# Patient Record
Sex: FEMALE | Race: WHITE | NOT HISPANIC OR LATINO | Employment: UNEMPLOYED | URBAN - METROPOLITAN AREA
[De-identification: names, ages, dates, MRNs, and addresses within clinical notes are randomized per-mention and may not be internally consistent; named-entity substitution may affect disease eponyms.]

---

## 2021-09-14 ENCOUNTER — OFFICE VISIT (OUTPATIENT)
Dept: FAMILY MEDICINE CLINIC | Facility: CLINIC | Age: 31
End: 2021-09-14

## 2021-09-14 VITALS
RESPIRATION RATE: 16 BRPM | HEIGHT: 65 IN | SYSTOLIC BLOOD PRESSURE: 100 MMHG | OXYGEN SATURATION: 97 % | TEMPERATURE: 97.8 F | HEART RATE: 88 BPM | DIASTOLIC BLOOD PRESSURE: 70 MMHG | WEIGHT: 169 LBS | BODY MASS INDEX: 28.16 KG/M2

## 2021-09-14 DIAGNOSIS — Z76.89 ENCOUNTER TO ESTABLISH CARE: ICD-10-CM

## 2021-09-14 DIAGNOSIS — B35.4 TINEA CORPORIS: Primary | ICD-10-CM

## 2021-09-14 PROCEDURE — 99214 OFFICE O/P EST MOD 30 MIN: CPT | Performed by: NURSE PRACTITIONER

## 2021-09-14 NOTE — PROGRESS NOTES
FAMILY PRACTICE OFFICE VISIT       NAME: Mariel Villar  AGE: 28 y o  SEX: female       : 1990        MRN: [id number]    Assessment and Plan     Problem List Items Addressed This Visit     None      Visit Diagnoses     Tinea corporis    -  Primary    Relevant Medications    ciclopirox (LOPROX) 0 77 % cream    Encounter to establish care              1  Tinea corporis  ciclopirox (LOPROX) 0 77 % cream   2  Encounter to establish care       This 28year old female presents today to establish care  No recent PCP  Denies any significant past medical or surgical history  Mom of one 3year old daughter, no longer breastfeeding  Is trying to conceive second child  Persistent rash over the past 2-3 months, consistent with tinea corporis  Has tried OTC Clotrimazole and Butenafine with out improvement  Will trial ciclopirox 0 77% cream  Apply thin layer twice daily for 3 weeks  If not effective to clear rash she will call  Chief Complaint     Chief Complaint   Patient presents with    Recurrent Skin Infections     NP is here for persistant ringworm       History of Present Illness     Alma Delia Lesly David is a 28year old female presenting today for rash  Has no recent PCP  Moved from [facility]   with one child, 3year old daughter  Trying to conceive second child  Denies any past medical or surgical history  Up to date on pap and gyn care  2 months ago started with rash on left breast  Initially itched, no further itching  Rash has gotten larger  Now has a new area of rash on left upper abdomen  Has used OTC clotrimazole and butenafine creams with no improvement  Review of Systems   Review of Systems   Constitutional: Negative  HENT: Negative  Respiratory: Negative  Cardiovascular: Negative  Genitourinary: Negative  Musculoskeletal: Negative  Skin: Positive for rash  Neurological: Negative  Psychiatric/Behavioral: Negative  Active Problem List   There is no problem list on file for this patient  Past Medical History:  No past medical history on file  Past Surgical History:  Past Surgical History:   Procedure Laterality Date    WISDOM TOOTH EXTRACTION         Family History:  Family History   Problem Relation Age of Onset    No Known Problems Mother     No Known Problems Father     No Known Problems Sister     No Known Problems Brother     No Known Problems Sister        Social History:  Social History     Socioeconomic History    Marital status: /Civil Union     Spouse name: Not on file    Number of children: Not on file    Years of education: Not on file    Highest education level: Not on file   Occupational History    Not on file   Tobacco Use    Smoking status: Never Smoker    Smokeless tobacco: Never Used   Vaping Use    Vaping Use: Never used   Substance and Sexual Activity    Alcohol use: Never    Drug use: Never    Sexual activity: Yes     Partners: Male     Birth control/protection: None   Other Topics Concern    Not on file   Social History Narrative    Not on file     Social Determinants of Health     Financial Resource Strain:     Difficulty of Paying Living Expenses:    Food Insecurity:     Worried About Running Out of Food in the Last Year:     [address] in the Last Year:    Transportation Needs:     Lack of Transportation (Medical):      Lack of Transportation (Non-Medical):    Physical Activity:     Days of Exercise per Week:     Minutes of Exercise per Session:    Stress:     Feeling of Stress :    Social Connections:     Frequency of Communication with Friends and Family:     Frequency of Social Gatherings with Friends and Family:     Attends Muslim Services:     Active Member of Clubs or Organizations:     Attends Club or Organization Meetings:     Marital Status:    Intimate Partner Violence:     Fear of Current or Ex-Partner:     Emotionally Abused:  Physically Abused:     Sexually Abused:        I have reviewed the patient's medical history in detail; there are no changes to the history as noted in the electronic medical record  Objective     Vitals:    09/14/21 0931   BP: 100/70   Pulse: 88   Resp: 16   Temp: 97 8 °F (36 6 °C)   TempSrc: Tympanic   SpO2: 97%   Weight: 76 7 kg (169 lb)   Height: 5' 5 35" (1 66 m)     Wt Readings from Last 3 Encounters:   09/14/21 76 7 kg (169 lb)     Physical Exam  Vitals and nursing note reviewed  Constitutional:       General: She is not in acute distress  Appearance: Normal appearance  She is not ill-appearing  Cardiovascular:      Rate and Rhythm: Normal rate and regular rhythm  Heart sounds: No murmur heard  Pulmonary:      Effort: Pulmonary effort is normal       Breath sounds: Normal breath sounds  Skin:     Findings: Rash present  Comments: Left breast with flat rash, raised borders, irregular borders  Borders of rash, pink with central clearing  Rash is located on left breast 8 o'clock to 10 o'clock  Left upper abdomen with single red sightly raised, slightly scaling 1 cm round area of rash  Neurological:      Mental Status: She is alert  Psychiatric:         Mood and Affect: Mood normal        BMI Counseling: Body mass index is 27 82 kg/m²  The BMI is above normal  Nutrition recommendations include encouraging healthy choices of fruits and vegetables  due to patient being overweight  ALLERGIES:  No Known Allergies    Current Medications     Current Outpatient Medications   Medication Sig Dispense Refill    ciclopirox (LOPROX) 0 77 % cream Apply topically 2 (two) times a day 30 g 0     No current facility-administered medications for this visit           Health Maintenance     Health Maintenance   Topic Date Due    Hepatitis C Screening  Never done    COVID-19 Vaccine (1) Never done    HIV Screening  Never done    BMI: Followup Plan  Never done    Annual Physical Never done    DTaP,Tdap,and Td Vaccines (1 - Tdap) Never done    Cervical Cancer Screening  Never done    Influenza Vaccine (1) 09/01/2021    Depression Screening  09/14/2022    BMI: Adult  09/14/2022    Pneumococcal Vaccine: Pediatrics (0 to 5 Years) and At-Risk Patients (6 to 59 Years)  Aged Out    HIB Vaccine  Aged Out    Hepatitis B Vaccine  Aged Out    IPV Vaccine  Aged Out    Hepatitis A Vaccine  Aged Out    Meningococcal ACWY Vaccine  Aged Out    HPV Vaccine  Aged Out       There is no immunization history on file for this patient      Alma Delia Carrillo

## 2021-10-05 ENCOUNTER — OFFICE VISIT (OUTPATIENT)
Dept: FAMILY MEDICINE CLINIC | Facility: CLINIC | Age: 31
End: 2021-10-05

## 2021-10-05 VITALS
RESPIRATION RATE: 16 BRPM | HEART RATE: 87 BPM | BODY MASS INDEX: 28.16 KG/M2 | DIASTOLIC BLOOD PRESSURE: 80 MMHG | TEMPERATURE: 97.8 F | OXYGEN SATURATION: 98 % | SYSTOLIC BLOOD PRESSURE: 130 MMHG | WEIGHT: 169 LBS | HEIGHT: 65 IN

## 2021-10-05 DIAGNOSIS — B35.4 TINEA CORPORIS: Primary | ICD-10-CM

## 2021-10-05 PROCEDURE — 99212 OFFICE O/P EST SF 10 MIN: CPT | Performed by: NURSE PRACTITIONER

## 2021-10-05 RX ORDER — KETOCONAZOLE 20 MG/G
CREAM TOPICAL DAILY
Qty: 30 G | Refills: 0 | Status: SHIPPED | OUTPATIENT
Start: 2021-10-05

## 2021-10-12 ENCOUNTER — TELEPHONE (OUTPATIENT)
Dept: FAMILY MEDICINE CLINIC | Facility: CLINIC | Age: 31
End: 2021-10-12

## 2021-10-14 DIAGNOSIS — B35.4 TINEA CORPORIS: Primary | ICD-10-CM

## 2021-10-14 RX ORDER — FLUCONAZOLE 150 MG/1
150 TABLET ORAL WEEKLY
Qty: 4 TABLET | Refills: 0 | Status: SHIPPED | OUTPATIENT
Start: 2021-10-14 | End: 2021-11-05

## 2021-11-11 DIAGNOSIS — B35.4 TINEA CORPORIS: Primary | ICD-10-CM

## 2021-11-11 RX ORDER — FLUCONAZOLE 150 MG/1
150 TABLET ORAL WEEKLY
Qty: 2 TABLET | Refills: 0 | Status: SHIPPED | OUTPATIENT
Start: 2021-11-11 | End: 2021-11-19

## 2021-12-27 DIAGNOSIS — R21 RASH: Primary | ICD-10-CM

## 2021-12-30 ENCOUNTER — CONSULT (OUTPATIENT)
Dept: DERMATOLOGY | Facility: CLINIC | Age: 31
End: 2021-12-30

## 2021-12-30 VITALS — TEMPERATURE: 98.2 F | HEIGHT: 65 IN | WEIGHT: 166 LBS | BODY MASS INDEX: 27.66 KG/M2

## 2021-12-30 DIAGNOSIS — L30.9 ECZEMA, UNSPECIFIED TYPE: ICD-10-CM

## 2021-12-30 PROCEDURE — 99243 OFF/OP CNSLTJ NEW/EST LOW 30: CPT | Performed by: DERMATOLOGY

## 2023-11-09 ENCOUNTER — OFFICE VISIT (OUTPATIENT)
Age: 33
End: 2023-11-09
Payer: MEDICARE

## 2023-11-09 VITALS
WEIGHT: 166 LBS | HEIGHT: 65 IN | HEART RATE: 84 BPM | SYSTOLIC BLOOD PRESSURE: 128 MMHG | BODY MASS INDEX: 27.66 KG/M2 | DIASTOLIC BLOOD PRESSURE: 84 MMHG | OXYGEN SATURATION: 99 %

## 2023-11-09 DIAGNOSIS — M99.03 SEGMENTAL DYSFUNCTION OF LUMBAR REGION: ICD-10-CM

## 2023-11-09 DIAGNOSIS — S16.1XXA CERVICAL MYOFASCIAL STRAIN, INITIAL ENCOUNTER: Primary | ICD-10-CM

## 2023-11-09 DIAGNOSIS — S33.6XXA SPRAIN OF SACROILIAC REGION, INITIAL ENCOUNTER: ICD-10-CM

## 2023-11-09 DIAGNOSIS — M99.02 SEGMENTAL DYSFUNCTION OF THORACIC REGION: ICD-10-CM

## 2023-11-09 PROCEDURE — 98941 CHIROPRACT MANJ 3-4 REGIONS: CPT | Performed by: CHIROPRACTOR

## 2023-11-09 PROCEDURE — 99203 OFFICE O/P NEW LOW 30 MIN: CPT | Performed by: CHIROPRACTOR

## 2023-11-09 NOTE — PROGRESS NOTES
Initial date of service: 11/9/23    Diagnoses and all orders for this visit:    Cervical myofascial strain, initial encounter    Sprain of sacroiliac region, initial encounter - Left    Segmental dysfunction of thoracic region    Segmental dysfunction of lumbar region      No red flags, radiculopathy or neurologic deficit appreciated clinically. Pt's symptoms and exam findings consistent with mechanical neck/ubp secondary to repetitive st/sp injury, exacerbated by postural/ergonomic stressors. Pt responded well to traction, stretches and manual mobilization of the affected spinal and myofascial jt dysfunction, with increased ROM; trial of conservative tx recommended consisting of stretching, ther-ex, graded mobilization/manipulation of the affected spinal/myofascial tissues, postural/ergonomic education, and take home stretches/exercises. If symptoms fail to improve with short trial of conservative care, appropriate imaging and referral will be coordinated. Spent greater than 30 min c pt discussing hx, pe, ddx, tx options and reviewing notes/imaging    TREATMENT: 08014  Fear avoidance behavior discussion, encouraged and reassured pt that natural course of condition is to improve over time with adherence to tx plan and home care strategies. Home care recommendations: avoid bed rest, walk (but avoid trails and uneven surfaces), gradual return to activity to tolerance (avoid anything that peripheralizes symptoms), ice 20 min on/off, watch for ice burn, call if symptoms peripheralize, worsen, or neurologic deficit progresses. Ther-ex: IASTM - discussed post procedure soreness and/or ecchymosis for up to 36 hrs, applied to affected mm hypertonicities; wall cullen, axial retraction, upper trap stretch, lev scap stretch, SCM stretch, glute stretch.  Thoracic mobilization/manipulation: prone P-A mob, supine A-P manip; cervical mobilization/manipulation: traction, diversified supine graded mobilization, lumbar side laying graded mobs; L SI LAT    HPI:  Ana Vallecillo is a 29 y.o. female   Chief Complaint   Patient presents with    Back Pain     Lower back pain-7    Neck Pain     Neck pain-7     Pt presents for eval and for neck/back pain. Pt reports increasing episodes of "throwing back or neck out". Pt has undergone chiro care with benefit in the past. Pt had baby early 2023    Back Pain  This is a recurrent problem. The current episode started more than 1 month ago. The problem occurs daily. The problem has been waxing and waning since onset. The pain is present in the gluteal, lumbar spine, sacro-iliac and thoracic spine. The quality of the pain is described as aching and stabbing. The pain does not radiate. The symptoms are aggravated by bending, twisting and standing. Stiffness is present In the morning. Neck Pain   This is a recurrent problem. The current episode started more than 1 month ago. The problem occurs daily. The problem has been waxing and waning. The pain is present in the left side, midline and right side. The quality of the pain is described as aching and stabbing. The symptoms are aggravated by bending, position, stress and twisting. The pain is Worse during the day. History reviewed. No pertinent past medical history. The following portions of the patient's history were reviewed and updated as appropriate: allergies, past family history, past medical history, past social history, past surgical history, and problem list.  Review of Systems   Musculoskeletal:  Positive for back pain and neck pain. Physical Exam  Eyes:      Extraocular Movements: Extraocular movements intact. Neck:        Comments: Pnful and limited in FL, Ext, Brot, Llf  Cardiovascular:      Pulses: Normal pulses. Abdominal:      General: There is no distension. Tenderness: There is no abdominal tenderness. Musculoskeletal:      Cervical back: Pain with movement and muscular tenderness present. Decreased range of motion. Thoracic back: Spasms and tenderness present. Decreased range of motion. Lumbar back: Spasms and tenderness present. Decreased range of motion. Negative right straight leg raise test and negative left straight leg raise test.        Back:       Comments: Pnful and limited in Brot, Llf, Ext   Lymphadenopathy:      Cervical: No cervical adenopathy. Skin:     General: Skin is warm and dry. Neurological:      Mental Status: She is alert and oriented to person, place, and time. Cranial Nerves: Cranial nerves 2-12 are intact. Sensory: Sensation is intact. Motor: Motor function is intact. Coordination: Coordination is intact. Gait: Gait is intact. Gait and tandem walk normal.      Deep Tendon Reflexes: Reflexes normal. Babinski sign absent on the right side. Babinski sign absent on the left side. Reflex Scores:       Tricep reflexes are 2+ on the right side and 2+ on the left side. Bicep reflexes are 2+ on the right side and 2+ on the left side. Brachioradialis reflexes are 2+ on the right side and 2+ on the left side. Patellar reflexes are 2+ on the right side and 2+ on the left side. Achilles reflexes are 2+ on the right side and 2+ on the left side. Psychiatric:         Mood and Affect: Mood and affect normal.         Behavior: Behavior normal.       SOFT TISSUE ASSESSMENT: Hypertonicity and tenderness palpated B C5-T6. L5/S1 erector spinae, L glutr edmin upper traps, lev scap, SCM, scalene, rhomboid, suboccipitals JOINT RECTRICTIONS: C5-T, L5/S1 and L SIJ ORTHO: Diana unremarkable for centralization/peripheralization; max foraminal comp elicits local np L; shoulder depression elicits stiffness in L upper trap; brachial plexus tension test elicits no neural tension in R/L UE; cervical distraction palliative; No follow-ups on file.

## 2023-11-28 ENCOUNTER — PROCEDURE VISIT (OUTPATIENT)
Age: 33
End: 2023-11-28
Payer: MEDICARE

## 2023-11-28 VITALS
WEIGHT: 166 LBS | OXYGEN SATURATION: 99 % | DIASTOLIC BLOOD PRESSURE: 68 MMHG | BODY MASS INDEX: 27.66 KG/M2 | HEART RATE: 101 BPM | HEIGHT: 65 IN | SYSTOLIC BLOOD PRESSURE: 110 MMHG

## 2023-11-28 DIAGNOSIS — M99.03 SEGMENTAL DYSFUNCTION OF LUMBAR REGION: ICD-10-CM

## 2023-11-28 DIAGNOSIS — M99.02 SEGMENTAL DYSFUNCTION OF THORACIC REGION: ICD-10-CM

## 2023-11-28 DIAGNOSIS — S33.6XXA SPRAIN OF SACROILIAC REGION, INITIAL ENCOUNTER: ICD-10-CM

## 2023-11-28 DIAGNOSIS — S16.1XXA CERVICAL MYOFASCIAL STRAIN, INITIAL ENCOUNTER: Primary | ICD-10-CM

## 2023-11-28 PROCEDURE — 98941 CHIROPRACT MANJ 3-4 REGIONS: CPT | Performed by: CHIROPRACTOR

## 2023-11-28 NOTE — PROGRESS NOTES
Initial date of service: 11/9/23    Diagnoses and all orders for this visit:    Cervical myofascial strain, initial encounter    Sprain of sacroiliac region, initial encounter - Left    Segmental dysfunction of thoracic region    Segmental dysfunction of lumbar region    Pt improved with reduced pain and increased ROM    TREATMENT: 69870  Ther-ex: IASTM - discussed post procedure soreness and/or ecchymosis for up to 36 hrs, applied to affected mm hypertonicities; wall cullen, axial retraction, upper trap stretch, lev scap stretch, SCM stretch, glute stretch. Thoracic mobilization/manipulation: prone P-A mob, supine A-P manip; cervical mobilization/manipulation: traction, diversified supine graded mobilization, lumbar side laying graded mobs; L SI LAT    HPI:  Jamison Sevilla is a 29 y.o. female   Chief Complaint   Patient presents with    Back Pain     Lower back pain-3    Neck Pain     Neck pain-3     Pt presents for tx for neck/back pain. Pt reports increasing episodes of "throwing back or neck out". Pt has undergone chiro care with benefit in the past. Pt had baby early 2023 11/28: Pt reports moving and feeling slightly better since last tx    Back Pain  This is a recurrent problem. The current episode started more than 1 month ago. The problem occurs daily. The problem has been waxing and waning since onset. The pain is present in the gluteal, lumbar spine, sacro-iliac and thoracic spine. The quality of the pain is described as aching and stabbing. The pain does not radiate. The symptoms are aggravated by bending, twisting and standing. Stiffness is present In the morning. Neck Pain   This is a recurrent problem. The current episode started more than 1 month ago. The problem occurs daily. The problem has been waxing and waning. The pain is present in the left side, midline and right side. The quality of the pain is described as aching and stabbing.  The symptoms are aggravated by bending, position, stress and twisting. The pain is Worse during the day. History reviewed. No pertinent past medical history. The following portions of the patient's history were reviewed and updated as appropriate: allergies, past family history, past medical history, past social history, past surgical history, and problem list.  Review of Systems   Musculoskeletal:  Positive for back pain and neck pain. Physical Exam  Neck:        Comments: Pnful and limited in FL, Ext, Brot, Llf  Musculoskeletal:      Cervical back: Pain with movement and muscular tenderness present. Decreased range of motion. Thoracic back: Spasms and tenderness present. Decreased range of motion. Lumbar back: Spasms and tenderness present. Decreased range of motion. Negative right straight leg raise test and negative left straight leg raise test.        Back:       Comments: Pnful and limited in Brot, Llf, Ext   Lymphadenopathy:      Cervical: No cervical adenopathy. Skin:     General: Skin is warm and dry. Neurological:      Mental Status: She is alert and oriented to person, place, and time. Gait: Gait is intact. Psychiatric:         Mood and Affect: Mood and affect normal.         Behavior: Behavior normal.       SOFT TISSUE ASSESSMENT: Hypertonicity and tenderness palpated B C5-T6. L5/S1 erector spinae, L glutr edmin upper traps, lev scap, SCM, scalene, rhomboid, suboccipitals JOINT RECTRICTIONS: C5-T, L5/S1 and L SIJ     Return in about 1 week (around 12/5/2023) for Next scheduled follow up.

## 2023-12-05 ENCOUNTER — PROCEDURE VISIT (OUTPATIENT)
Age: 33
End: 2023-12-05
Payer: MEDICARE

## 2023-12-05 VITALS
WEIGHT: 166 LBS | DIASTOLIC BLOOD PRESSURE: 68 MMHG | HEART RATE: 88 BPM | OXYGEN SATURATION: 98 % | HEIGHT: 65 IN | BODY MASS INDEX: 27.66 KG/M2 | SYSTOLIC BLOOD PRESSURE: 112 MMHG

## 2023-12-05 DIAGNOSIS — M99.03 SEGMENTAL DYSFUNCTION OF LUMBAR REGION: ICD-10-CM

## 2023-12-05 DIAGNOSIS — M99.02 SEGMENTAL DYSFUNCTION OF THORACIC REGION: ICD-10-CM

## 2023-12-05 DIAGNOSIS — S16.1XXA CERVICAL MYOFASCIAL STRAIN, INITIAL ENCOUNTER: Primary | ICD-10-CM

## 2023-12-05 DIAGNOSIS — S33.6XXA SPRAIN OF SACROILIAC REGION, INITIAL ENCOUNTER: ICD-10-CM

## 2023-12-05 PROCEDURE — 98941 CHIROPRACT MANJ 3-4 REGIONS: CPT | Performed by: CHIROPRACTOR

## 2023-12-05 NOTE — PROGRESS NOTES
Initial date of service: 11/9/23    Diagnoses and all orders for this visit:    Cervical myofascial strain, initial encounter    Sprain of sacroiliac region, initial encounter - Left    Segmental dysfunction of thoracic region    Segmental dysfunction of lumbar region    Pt improved with reduced pain and increased ROM    TREATMENT: 75029  Ther-ex: IASTM - discussed post procedure soreness and/or ecchymosis for up to 36 hrs, applied to affected mm hypertonicities; wall cullen, axial retraction, upper trap stretch, lev scap stretch, SCM stretch, glute stretch. Thoracic mobilization/manipulation: prone P-A mob, supine A-P manip; cervical mobilization/manipulation: traction, diversified supine graded mobilization, lumbar side laying graded mobs; L SI LAT    HPI:  Jessi Conner is a 29 y.o. female   Chief Complaint   Patient presents with    Back Pain     Lower back pain-1    Neck Pain     Neck pain-1     Pt presents for tx for neck/back pain. Pt reports increasing episodes of "throwing back or neck out". Pt has undergone chiro care with benefit in the past. Pt had baby early 2023 12/5: Pt reports moving and feeling better since last tx; was better with her home program    Back Pain  This is a recurrent problem. The current episode started more than 1 month ago. The problem occurs daily. The problem has been waxing and waning since onset. The pain is present in the gluteal, lumbar spine, sacro-iliac and thoracic spine. The quality of the pain is described as aching and stabbing. The pain does not radiate. The symptoms are aggravated by bending, twisting and standing. Stiffness is present In the morning. Neck Pain   This is a recurrent problem. The current episode started more than 1 month ago. The problem occurs daily. The problem has been waxing and waning. The pain is present in the left side, midline and right side. The quality of the pain is described as aching and stabbing.  The symptoms are aggravated by bending, position, stress and twisting. The pain is Worse during the day. History reviewed. No pertinent past medical history. The following portions of the patient's history were reviewed and updated as appropriate: allergies, past family history, past medical history, past social history, past surgical history, and problem list.  Review of Systems   Musculoskeletal:  Positive for back pain and neck pain. Physical Exam  Neck:        Comments: Pnful and limited in FL, Ext, Brot, Llf  Musculoskeletal:      Cervical back: Pain with movement and muscular tenderness present. Decreased range of motion. Thoracic back: Spasms and tenderness present. Decreased range of motion. Lumbar back: Spasms and tenderness present. Decreased range of motion. Negative right straight leg raise test and negative left straight leg raise test.        Back:       Comments: Pnful and limited in Brot, Llf, Ext   Lymphadenopathy:      Cervical: No cervical adenopathy. Skin:     General: Skin is warm and dry. Neurological:      Mental Status: She is alert and oriented to person, place, and time. Gait: Gait is intact. Psychiatric:         Mood and Affect: Mood and affect normal.         Behavior: Behavior normal.       SOFT TISSUE ASSESSMENT: Hypertonicity and tenderness palpated B C5-T6. L5/S1 erector spinae, L glutr edmin upper traps, lev scap, SCM, scalene, rhomboid, suboccipitals JOINT RECTRICTIONS: C5-T, L5/S1 and L SIJ     Return in about 1 week (around 12/12/2023).

## 2023-12-19 ENCOUNTER — PROCEDURE VISIT (OUTPATIENT)
Age: 33
End: 2023-12-19
Payer: MEDICARE

## 2023-12-19 VITALS
DIASTOLIC BLOOD PRESSURE: 68 MMHG | WEIGHT: 166 LBS | HEIGHT: 65 IN | HEART RATE: 107 BPM | SYSTOLIC BLOOD PRESSURE: 124 MMHG | BODY MASS INDEX: 27.66 KG/M2 | OXYGEN SATURATION: 97 %

## 2023-12-19 DIAGNOSIS — S16.1XXA CERVICAL MYOFASCIAL STRAIN, INITIAL ENCOUNTER: Primary | ICD-10-CM

## 2023-12-19 DIAGNOSIS — M99.03 SEGMENTAL DYSFUNCTION OF LUMBAR REGION: ICD-10-CM

## 2023-12-19 DIAGNOSIS — S33.6XXA SPRAIN OF SACROILIAC REGION, INITIAL ENCOUNTER: ICD-10-CM

## 2023-12-19 DIAGNOSIS — M99.02 SEGMENTAL DYSFUNCTION OF THORACIC REGION: ICD-10-CM

## 2023-12-19 PROCEDURE — 98941 CHIROPRACT MANJ 3-4 REGIONS: CPT | Performed by: CHIROPRACTOR

## 2023-12-19 NOTE — PROGRESS NOTES
"Initial date of service: 11/9/23    Diagnoses and all orders for this visit:    Cervical myofascial strain, initial encounter    Sprain of sacroiliac region, initial encounter - Left    Segmental dysfunction of thoracic region    Segmental dysfunction of lumbar region    Pt improved with reduced pain and increased ROM    TREATMENT: [id number]  Ther-ex: IASTM - discussed post procedure soreness and/or ecchymosis for up to 36 hrs, applied to affected mm hypertonicities; wall cullen, axial retraction, upper trap stretch, lev scap stretch, SCM stretch, glute stretch. Thoracic mobilization/manipulation: prone P-A mob, supine A-P manip; cervical mobilization/manipulation: traction, diversified supine graded mobilization, lumbar side laying graded mobs; L SI LAT    HPI:  Alma Delia Hall is a 33 y.o. female   Chief Complaint   Patient presents with    Back Pain     Lower back pain-1    Neck Pain     Neck pain-1     Pt presents for tx for neck/back pain. Pt reports increasing episodes of \"throwing back or neck out\". Pt has undergone chiro care with benefit in the past. Pt had baby early 2023 12/19: Pt reports moving and feeling better since last tx but suffered flare-up with being ill for past week    Back Pain  This is a recurrent problem. The current episode started more than 1 month ago. The problem occurs daily. The problem has been waxing and waning since onset. The pain is present in the gluteal, lumbar spine, sacro-iliac and thoracic spine. The quality of the pain is described as aching and stabbing. The pain does not radiate. The symptoms are aggravated by bending, twisting and standing. Stiffness is present In the morning.   Neck Pain   This is a recurrent problem. The current episode started more than 1 month ago. The problem occurs daily. The problem has been waxing and waning. The pain is present in the left side, midline and right side. The quality of the pain is described as aching and stabbing. The symptoms are " aggravated by bending, position, stress and twisting. The pain is Worse during the day.     History reviewed. No pertinent past medical history.   The following portions of the patient's history were reviewed and updated as appropriate: allergies, past family history, past medical history, past social history, past surgical history, and problem list.  Review of Systems   Musculoskeletal:  Positive for back pain and neck pain.     Physical Exam  Neck:        Comments: Pnful and limited in FL, Ext, Brot, Llf  Musculoskeletal:      Cervical back: Pain with movement and muscular tenderness present. Decreased range of motion.      Thoracic back: Spasms and tenderness present. Decreased range of motion.      Lumbar back: Spasms and tenderness present. Decreased range of motion. Negative right straight leg raise test and negative left straight leg raise test.        Back:       Comments: Pnful and limited in Brot, Llf, Ext   Lymphadenopathy:      Cervical: No cervical adenopathy.   Skin:     General: Skin is warm and dry.   Neurological:      Mental Status: She is alert and oriented to person, place, and time.      Gait: Gait is intact.   Psychiatric:         Mood and Affect: Mood and affect normal.         Behavior: Behavior normal.       SOFT TISSUE ASSESSMENT: Hypertonicity and tenderness palpated B C5-T6. L5/S1 erector spinae, L glutr edmin upper traps, lev scap, SCM, scalene, rhomboid, suboccipitals JOINT RECTRICTIONS: C5-T, L5/S1 and L SIJ     Return in about 2 weeks (around 1/2/2024) for Next scheduled follow up.

## 2024-02-22 ENCOUNTER — PROCEDURE VISIT (OUTPATIENT)
Age: 34
End: 2024-02-22
Payer: MEDICARE

## 2024-02-22 VITALS
HEART RATE: 85 BPM | BODY MASS INDEX: 27.66 KG/M2 | WEIGHT: 166 LBS | SYSTOLIC BLOOD PRESSURE: 114 MMHG | DIASTOLIC BLOOD PRESSURE: 66 MMHG | HEIGHT: 65 IN | OXYGEN SATURATION: 99 %

## 2024-02-22 DIAGNOSIS — S33.6XXA SPRAIN OF SACROILIAC REGION, INITIAL ENCOUNTER: ICD-10-CM

## 2024-02-22 DIAGNOSIS — M99.03 SEGMENTAL DYSFUNCTION OF LUMBAR REGION: ICD-10-CM

## 2024-02-22 DIAGNOSIS — M99.02 SEGMENTAL DYSFUNCTION OF THORACIC REGION: ICD-10-CM

## 2024-02-22 DIAGNOSIS — S16.1XXA CERVICAL MYOFASCIAL STRAIN, INITIAL ENCOUNTER: Primary | ICD-10-CM

## 2024-02-22 PROCEDURE — 98941 CHIROPRACT MANJ 3-4 REGIONS: CPT | Performed by: CHIROPRACTOR

## 2024-02-22 NOTE — PROGRESS NOTES
"Initial date of service: 11/9/23    Diagnoses and all orders for this visit:    Cervical myofascial strain, initial encounter    Sprain of sacroiliac region, initial encounter - Left    Segmental dysfunction of thoracic region    Segmental dysfunction of lumbar region    Pt suffered exacerbation but responded to tx with reduced pain and increased ROM    TREATMENT: [id number]  Ther-ex: IASTM - discussed post procedure soreness and/or ecchymosis for up to 36 hrs, applied to affected mm hypertonicities; wall cullen, axial retraction, upper trap stretch, lev scap stretch, SCM stretch, glute stretch. Thoracic mobilization/manipulation: prone P-A mob, supine A-P manip; cervical mobilization/manipulation: traction, diversified supine graded mobilization, lumbar side laying graded mobs; L SI LAT    HPI:  Alma Delia Hall is a 33 y.o. female   No chief complaint on file.    Pt presents for tx for neck/back pain. Pt reports increasing episodes of \"throwing back or neck out\". Pt has undergone chiro care with benefit in the past. Pt had baby early 2023 2/22: Pt reports moving and feeling better since last tx but suffered flare-up     Back Pain  This is a recurrent problem. The current episode started more than 1 month ago. The problem occurs daily. The problem has been waxing and waning since onset. The pain is present in the gluteal, lumbar spine, sacro-iliac and thoracic spine. The quality of the pain is described as aching and stabbing. The pain does not radiate. The symptoms are aggravated by bending, twisting and standing. Stiffness is present In the morning.   Neck Pain   This is a recurrent problem. The current episode started more than 1 month ago. The problem occurs daily. The problem has been waxing and waning. The pain is present in the left side, midline and right side. The quality of the pain is described as aching and stabbing. The symptoms are aggravated by bending, position, stress and twisting. The pain is Worse during the " day.     No past medical history on file.   The following portions of the patient's history were reviewed and updated as appropriate: allergies, past family history, past medical history, past social history, past surgical history, and problem list.  Review of Systems   Musculoskeletal:  Positive for back pain and neck pain.     Physical Exam  Neck:        Comments: Pnful and limited in [place], Ext, Brot, Llf  Musculoskeletal:      Cervical back: Pain with movement and muscular tenderness present. Decreased range of motion.      Thoracic back: Spasms and tenderness present. Decreased range of motion.      Lumbar back: Spasms and tenderness present. Decreased range of motion. Negative right straight leg raise test and negative left straight leg raise test.        Back:       Comments: Pnful and limited in Brot, Llf, Ext   Lymphadenopathy:      Cervical: No cervical adenopathy.   Skin:     General: Skin is warm and dry.   Neurological:      Mental Status: She is alert and oriented to person, place, and time.      Gait: Gait is intact.   Psychiatric:         Mood and Affect: Mood and affect normal.         Behavior: Behavior normal.       SOFT TISSUE ASSESSMENT: Hypertonicity and tenderness palpated B C5-T6. L5/S1 erector spinae, L glutr edmin upper traps, lev scap, SCM, scalene, rhomboid, suboccipitals JOINT RECTRICTIONS: C5-T, L5/S1 and L SIJ     Return in about 1 week (around 2/29/2024) for Next scheduled follow up.

## 2024-03-07 ENCOUNTER — PROCEDURE VISIT (OUTPATIENT)
Age: 34
End: 2024-03-07
Payer: MEDICARE

## 2024-03-07 VITALS
WEIGHT: 166.01 LBS | HEART RATE: 94 BPM | BODY MASS INDEX: 27.66 KG/M2 | DIASTOLIC BLOOD PRESSURE: 82 MMHG | HEIGHT: 65 IN | SYSTOLIC BLOOD PRESSURE: 120 MMHG

## 2024-03-07 DIAGNOSIS — M99.02 SEGMENTAL DYSFUNCTION OF THORACIC REGION: ICD-10-CM

## 2024-03-07 DIAGNOSIS — S16.1XXA CERVICAL MYOFASCIAL STRAIN, INITIAL ENCOUNTER: Primary | ICD-10-CM

## 2024-03-07 DIAGNOSIS — S33.6XXA SPRAIN OF SACROILIAC REGION, INITIAL ENCOUNTER: ICD-10-CM

## 2024-03-07 DIAGNOSIS — M99.03 SEGMENTAL DYSFUNCTION OF LUMBAR REGION: ICD-10-CM

## 2024-03-07 PROCEDURE — 98941 CHIROPRACT MANJ 3-4 REGIONS: CPT | Performed by: CHIROPRACTOR

## 2024-03-07 NOTE — PROGRESS NOTES
"Initial date of service: 11/9/23    Diagnoses and all orders for this visit:    Cervical myofascial strain, initial encounter    Sprain of sacroiliac region, initial encounter - Left    Segmental dysfunction of thoracic region    Segmental dysfunction of lumbar region    Pt suffered exacerbation but responded to tx with reduced pain and increased ROM    TREATMENT: [id number]  Ther-ex: IASTM - discussed post procedure soreness and/or ecchymosis for up to 36 hrs, applied to affected mm hypertonicities; wall cullen, axial retraction, upper trap stretch, lev scap stretch, SCM stretch, glute stretch. Thoracic mobilization/manipulation: prone P-A mob, supine A-P manip; cervical mobilization/manipulation: traction, diversified supine graded mobilization, lumbar side laying graded mobs; L SI LAT    HPI:  Alma Delia Hall is a 33 y.o. female   No chief complaint on file.    Pt presents for tx for neck/back pain. Pt reports increasing episodes of \"throwing back or neck out\". Pt has undergone chiro care with benefit in the past. Pt had baby early 2023  3/7: Pt reports moving and feeling better since last tx but suffered flare-up of neck pain; \"slept on it wrong\"    Back Pain  This is a recurrent problem. The current episode started more than 1 month ago. The problem occurs daily. The problem has been waxing and waning since onset. The pain is present in the gluteal, lumbar spine, sacro-iliac and thoracic spine. The quality of the pain is described as aching and stabbing. The pain does not radiate. The symptoms are aggravated by bending, twisting and standing. Stiffness is present In the morning.   Neck Pain   This is a recurrent problem. The current episode started more than 1 month ago. The problem occurs daily. The problem has been waxing and waning. The pain is present in the left side, midline and right side. The quality of the pain is described as aching and stabbing. The symptoms are aggravated by bending, position, stress and " twisting. The pain is Worse during the day.     History reviewed. No pertinent past medical history.   The following portions of the patient's history were reviewed and updated as appropriate: allergies, past family history, past medical history, past social history, past surgical history, and problem list.  Review of Systems   Musculoskeletal:  Positive for back pain and neck pain.     Physical Exam  Neck:        Comments: Pnful and limited in [place], Ext, Brot, Llf  Musculoskeletal:      Cervical back: Pain with movement and muscular tenderness present. Decreased range of motion.      Thoracic back: Spasms and tenderness present. Decreased range of motion.      Lumbar back: Spasms and tenderness present. Decreased range of motion. Negative right straight leg raise test and negative left straight leg raise test.        Back:       Comments: Pnful and limited in Brot, Llf, Ext   Lymphadenopathy:      Cervical: No cervical adenopathy.   Skin:     General: Skin is warm and dry.   Neurological:      Mental Status: She is alert and oriented to person, place, and time.      Gait: Gait is intact.   Psychiatric:         Mood and Affect: Mood and affect normal.         Behavior: Behavior normal.       SOFT TISSUE ASSESSMENT: Hypertonicity and tenderness palpated B C5-T6. L5/S1 erector spinae, L glutr edmin upper traps, lev scap, SCM, scalene, rhomboid, suboccipitals JOINT RECTRICTIONS: C5-T, L5/S1 and L SIJ     Return in about 1 week (around 3/14/2024).

## 2024-03-26 ENCOUNTER — PROCEDURE VISIT (OUTPATIENT)
Age: 34
End: 2024-03-26
Payer: MEDICARE

## 2024-03-26 VITALS
BODY MASS INDEX: 27.66 KG/M2 | SYSTOLIC BLOOD PRESSURE: 118 MMHG | HEIGHT: 65 IN | HEART RATE: 112 BPM | OXYGEN SATURATION: 99 % | DIASTOLIC BLOOD PRESSURE: 66 MMHG | WEIGHT: 166 LBS

## 2024-03-26 DIAGNOSIS — S16.1XXA CERVICAL MYOFASCIAL STRAIN, INITIAL ENCOUNTER: Primary | ICD-10-CM

## 2024-03-26 DIAGNOSIS — M99.02 SEGMENTAL DYSFUNCTION OF THORACIC REGION: ICD-10-CM

## 2024-03-26 DIAGNOSIS — S33.6XXA SPRAIN OF SACROILIAC REGION, INITIAL ENCOUNTER: ICD-10-CM

## 2024-03-26 DIAGNOSIS — M99.03 SEGMENTAL DYSFUNCTION OF LUMBAR REGION: ICD-10-CM

## 2024-03-26 PROCEDURE — 98941 CHIROPRACT MANJ 3-4 REGIONS: CPT | Performed by: CHIROPRACTOR

## 2024-03-26 NOTE — PROGRESS NOTES
"Initial date of service: 11/9/23    Diagnoses and all orders for this visit:    Cervical myofascial strain, initial encounter    Sprain of sacroiliac region, initial encounter - Left    Segmental dysfunction of thoracic region    Segmental dysfunction of lumbar region    Pt suffered exacerbation but responded to tx with reduced pain and increased ROM    TREATMENT: [id number]  Ther-ex: IASTM - discussed post procedure soreness and/or ecchymosis for up to 36 hrs, applied to affected mm hypertonicities; wall cullen, axial retraction, upper trap stretch, lev scap stretch, SCM stretch, glute stretch. Thoracic mobilization/manipulation: prone P-A mob, supine A-P manip; cervical mobilization/manipulation: traction, diversified supine graded mobilization, lumbar side laying graded mobs; L SI LAT    HPI:  Alma Delia Hall is a 34 y.o. female   Chief Complaint   Patient presents with    Back Pain     Lower back pain-2     Pt presents for tx for neck/back pain. Pt reports increasing episodes of \"throwing back or neck out\". Pt has undergone chiro care with benefit in the past. Pt had baby early 2023  3/26: Pt reports moving and feeling better since last tx but suffered flare-up of L SIJ pain    Back Pain  This is a recurrent problem. The current episode started more than 1 month ago. The problem occurs daily. The problem has been waxing and waning since onset. The pain is present in the gluteal, lumbar spine, sacro-iliac and thoracic spine. The quality of the pain is described as aching and stabbing. The pain does not radiate. The symptoms are aggravated by bending, twisting and standing. Stiffness is present In the morning.   Neck Pain   This is a recurrent problem. The current episode started more than 1 month ago. The problem occurs daily. The problem has been waxing and waning. The pain is present in the left side, midline and right side. The quality of the pain is described as aching and stabbing. The symptoms are aggravated by " bending, position, stress and twisting. The pain is Worse during the day.     History reviewed. No pertinent past medical history.   The following portions of the patient's history were reviewed and updated as appropriate: allergies, past family history, past medical history, past social history, past surgical history, and problem list.  Review of Systems   Musculoskeletal:  Positive for back pain and neck pain.     Physical Exam  Neck:        Comments: Pnful and limited in [place], Ext, Brot, Llf  Musculoskeletal:      Cervical back: Pain with movement and muscular tenderness present. Decreased range of motion.      Thoracic back: Spasms and tenderness present. Decreased range of motion.      Lumbar back: Spasms and tenderness present. Decreased range of motion. Negative right straight leg raise test and negative left straight leg raise test.        Back:       Comments: Pnful and limited in Brot, Llf, Ext   Lymphadenopathy:      Cervical: No cervical adenopathy.   Skin:     General: Skin is warm and dry.   Neurological:      Mental Status: She is alert and oriented to person, place, and time.      Gait: Gait is intact.   Psychiatric:         Mood and Affect: Mood and affect normal.         Behavior: Behavior normal.       SOFT TISSUE ASSESSMENT: Hypertonicity and tenderness palpated B C5-T6. L5/S1 erector spinae, L glutr edmin upper traps, lev scap, SCM, scalene, rhomboid, suboccipitals JOINT RECTRICTIONS: C5-T, L5/S1 and L SIJ     Return in about 1 week (around 4/2/2024) for Next scheduled follow up.

## 2024-04-11 ENCOUNTER — PROCEDURE VISIT (OUTPATIENT)
Age: 34
End: 2024-04-11
Payer: MEDICARE

## 2024-04-11 VITALS
WEIGHT: 166 LBS | OXYGEN SATURATION: 99 % | BODY MASS INDEX: 27.66 KG/M2 | HEART RATE: 104 BPM | SYSTOLIC BLOOD PRESSURE: 118 MMHG | DIASTOLIC BLOOD PRESSURE: 74 MMHG | HEIGHT: 65 IN

## 2024-04-11 DIAGNOSIS — S33.6XXA SPRAIN OF SACROILIAC REGION, INITIAL ENCOUNTER: ICD-10-CM

## 2024-04-11 DIAGNOSIS — S16.1XXA CERVICAL MYOFASCIAL STRAIN, INITIAL ENCOUNTER: Primary | ICD-10-CM

## 2024-04-11 DIAGNOSIS — M99.03 SEGMENTAL DYSFUNCTION OF LUMBAR REGION: ICD-10-CM

## 2024-04-11 DIAGNOSIS — M99.02 SEGMENTAL DYSFUNCTION OF THORACIC REGION: ICD-10-CM

## 2024-04-11 PROCEDURE — 98941 CHIROPRACT MANJ 3-4 REGIONS: CPT | Performed by: CHIROPRACTOR

## 2024-04-11 NOTE — PROGRESS NOTES
"Initial date of service: 11/9/23    Diagnoses and all orders for this visit:    Cervical myofascial strain, initial encounter    Sprain of sacroiliac region, initial encounter - Left    Segmental dysfunction of thoracic region    Segmental dysfunction of lumbar region    Pt suffered exacerbation but responded to tx with reduced pain and increased ROM    TREATMENT: [id number]  Ther-ex: IASTM - discussed post procedure soreness and/or ecchymosis for up to 36 hrs, applied to affected mm hypertonicities; wall cullen, axial retraction, upper trap stretch, lev scap stretch, SCM stretch, glute stretch. Thoracic mobilization/manipulation: prone P-A mob, supine A-P manip; cervical mobilization/manipulation: traction, diversified supine graded mobilization, lumbar side laying graded mobs; L SI LAT    HPI:  Alma Delia Hall is a 34 y.o. female   Chief Complaint   Patient presents with    Back Pain     Lower back pain-2     Pt presents for tx for neck/back pain. Pt reports increasing episodes of \"throwing back or neck out\". Pt has undergone chiro care with benefit in the past. Pt had baby early 2023 4/11: Pt reports moving and feeling better since last tx     Back Pain  This is a recurrent problem. The current episode started more than 1 month ago. The problem occurs daily. The problem has been waxing and waning since onset. The pain is present in the gluteal, lumbar spine, sacro-iliac and thoracic spine. The quality of the pain is described as aching and stabbing. The pain does not radiate. The symptoms are aggravated by bending, twisting and standing. Stiffness is present In the morning.   Neck Pain   This is a recurrent problem. The current episode started more than 1 month ago. The problem occurs daily. The problem has been waxing and waning. The pain is present in the left side, midline and right side. The quality of the pain is described as aching and stabbing. The symptoms are aggravated by bending, position, stress and twisting. " The pain is Worse during the day.     History reviewed. No pertinent past medical history.   The following portions of the patient's history were reviewed and updated as appropriate: allergies, past family history, past medical history, past social history, past surgical history, and problem list.  Review of Systems   Musculoskeletal:  Positive for back pain and neck pain.     Physical Exam  Neck:        Comments: Pnful and limited in [place], Ext, Brot, Llf  Musculoskeletal:      Cervical back: Pain with movement and muscular tenderness present. Decreased range of motion.      Thoracic back: Spasms and tenderness present. Decreased range of motion.      Lumbar back: Spasms and tenderness present. Decreased range of motion. Negative right straight leg raise test and negative left straight leg raise test.        Back:       Comments: Pnful and limited in Brot, Llf, Ext   Lymphadenopathy:      Cervical: No cervical adenopathy.   Skin:     General: Skin is warm and dry.   Neurological:      Mental Status: She is alert and oriented to person, place, and time.      Gait: Gait is intact.   Psychiatric:         Mood and Affect: Mood and affect normal.         Behavior: Behavior normal.       SOFT TISSUE ASSESSMENT: Hypertonicity and tenderness palpated B C5-T6. L5/S1 erector spinae, L glutr edmin upper traps, lev scap, SCM, scalene, rhomboid, suboccipitals JOINT RECTRICTIONS: C5-T, L5/S1 and L SIJ     Return in about 1 week (around 4/18/2024) for Next scheduled follow up.

## 2024-04-30 ENCOUNTER — PROCEDURE VISIT (OUTPATIENT)
Age: 34
End: 2024-04-30
Payer: MEDICARE

## 2024-04-30 VITALS
BODY MASS INDEX: 27.66 KG/M2 | DIASTOLIC BLOOD PRESSURE: 68 MMHG | HEART RATE: 80 BPM | SYSTOLIC BLOOD PRESSURE: 114 MMHG | OXYGEN SATURATION: 98 % | WEIGHT: 166 LBS | HEIGHT: 65 IN

## 2024-04-30 DIAGNOSIS — M99.02 SEGMENTAL DYSFUNCTION OF THORACIC REGION: ICD-10-CM

## 2024-04-30 DIAGNOSIS — S16.1XXA CERVICAL MYOFASCIAL STRAIN, INITIAL ENCOUNTER: Primary | ICD-10-CM

## 2024-04-30 DIAGNOSIS — M99.03 SEGMENTAL DYSFUNCTION OF LUMBAR REGION: ICD-10-CM

## 2024-04-30 DIAGNOSIS — S33.6XXA SPRAIN OF SACROILIAC REGION, INITIAL ENCOUNTER: ICD-10-CM

## 2024-04-30 PROCEDURE — 98941 CHIROPRACT MANJ 3-4 REGIONS: CPT | Performed by: CHIROPRACTOR

## 2024-04-30 NOTE — PROGRESS NOTES
"Initial date of service: 11/9/23    Diagnoses and all orders for this visit:    Cervical myofascial strain, initial encounter    Sprain of sacroiliac region, initial encounter - Left    Segmental dysfunction of thoracic region    Segmental dysfunction of lumbar region    Pt suffered exacerbation but responded to tx with reduced pain and increased ROM    TREATMENT: [id number]  Ther-ex: IASTM - discussed post procedure soreness and/or ecchymosis for up to 36 hrs, applied to affected mm hypertonicities; wall cullen, axial retraction, upper trap stretch, lev scap stretch, SCM stretch, glute stretch. Thoracic mobilization/manipulation: prone P-A mob, supine A-P manip; cervical mobilization/manipulation: traction, diversified supine graded mobilization, lumbar side laying graded mobs; L SI LAT    HPI:  Alma Delia Hall is a 34 y.o. female   Chief Complaint   Patient presents with    Neck Pain     Neck pain-3    Back Pain     Lower back pain-3     Pt presents for tx for neck/back pain. Pt reports increasing episodes of \"throwing back or neck out\". Pt has undergone chiro care with benefit in the past. Pt had baby early 2023 4/30: Pt reports moving and feeling better since last tx, but suffered flare-up waking up through night with son who is teething    Back Pain  This is a recurrent problem. The current episode started more than 1 month ago. The problem occurs daily. The problem has been waxing and waning since onset. The pain is present in the gluteal, lumbar spine, sacro-iliac and thoracic spine. The quality of the pain is described as aching and stabbing. The pain does not radiate. The symptoms are aggravated by bending, twisting and standing. Stiffness is present In the morning.   Neck Pain   This is a recurrent problem. The current episode started more than 1 month ago. The problem occurs daily. The problem has been waxing and waning. The pain is present in the left side, midline and right side. The quality of the pain is " described as aching and stabbing. The symptoms are aggravated by bending, position, stress and twisting. The pain is Worse during the day.     History reviewed. No pertinent past medical history.   The following portions of the patient's history were reviewed and updated as appropriate: allergies, past family history, past medical history, past social history, past surgical history, and problem list.  Review of Systems   Musculoskeletal:  Positive for back pain and neck pain.     Physical Exam  Neck:        Comments: Pnful and limited in [place], Ext, Brot, Llf  Musculoskeletal:      Cervical back: Pain with movement and muscular tenderness present. Decreased range of motion.      Thoracic back: Spasms and tenderness present. Decreased range of motion.      Lumbar back: Spasms and tenderness present. Decreased range of motion. Negative right straight leg raise test and negative left straight leg raise test.        Back:       Comments: Pnful and limited in Brot, Llf, Ext   Lymphadenopathy:      Cervical: No cervical adenopathy.   Skin:     General: Skin is warm and dry.   Neurological:      Mental Status: She is alert and oriented to person, place, and time.      Gait: Gait is intact.   Psychiatric:         Mood and Affect: Mood and affect normal.         Behavior: Behavior normal.       SOFT TISSUE ASSESSMENT: Hypertonicity and tenderness palpated B C5-T6. L5/S1 erector spinae, L glutr edmin upper traps, lev scap, SCM, scalene, rhomboid, suboccipitals JOINT RECTRICTIONS: C5-T, L5/S1 and L SIJ     Return in about 2 weeks (around 5/14/2024) for Next scheduled follow up.

## 2024-05-14 ENCOUNTER — PROCEDURE VISIT (OUTPATIENT)
Age: 34
End: 2024-05-14
Payer: MEDICARE

## 2024-05-14 VITALS
HEART RATE: 104 BPM | BODY MASS INDEX: 27.66 KG/M2 | OXYGEN SATURATION: 98 % | HEIGHT: 65 IN | SYSTOLIC BLOOD PRESSURE: 116 MMHG | DIASTOLIC BLOOD PRESSURE: 70 MMHG | WEIGHT: 166 LBS

## 2024-05-14 DIAGNOSIS — S33.6XXA SPRAIN OF SACROILIAC REGION, INITIAL ENCOUNTER: ICD-10-CM

## 2024-05-14 DIAGNOSIS — S16.1XXA CERVICAL MYOFASCIAL STRAIN, INITIAL ENCOUNTER: Primary | ICD-10-CM

## 2024-05-14 DIAGNOSIS — M99.03 SEGMENTAL DYSFUNCTION OF LUMBAR REGION: ICD-10-CM

## 2024-05-14 DIAGNOSIS — M99.02 SEGMENTAL DYSFUNCTION OF THORACIC REGION: ICD-10-CM

## 2024-05-14 PROCEDURE — 98941 CHIROPRACT MANJ 3-4 REGIONS: CPT | Performed by: CHIROPRACTOR

## 2024-05-14 NOTE — PROGRESS NOTES
"Initial date of service: 11/9/23    Diagnoses and all orders for this visit:    Cervical myofascial strain, initial encounter    Sprain of sacroiliac region, initial encounter - Left    Segmental dysfunction of thoracic region    Segmental dysfunction of lumbar region    Pt improved with reduced pain and increased ROM    TREATMENT: [id number]  Ther-ex: IASTM - discussed post procedure soreness and/or ecchymosis for up to 36 hrs, applied to affected mm hypertonicities; wall cullen, axial retraction, upper trap stretch, lev scap stretch, SCM stretch, glute stretch. Thoracic mobilization/manipulation: prone P-A mob, supine A-P manip; cervical mobilization/manipulation: traction, diversified supine graded mobilization, lumbar side laying graded mobs; L SI LAT    HPI:  Alma Delia Hall is a 34 y.o. female   Chief Complaint   Patient presents with    Neck Pain     Neck pain-2    Back Pain     Lower back pain-2     Pt presents for tx for neck/back pain. Pt reports increasing episodes of \"throwing back or neck out\". Pt has undergone chiro care with benefit in the past. Pt had baby early 2023 5/14: Pt reports moving and feeling better since last tx    Back Pain  This is a recurrent problem. The current episode started more than 1 month ago. The problem occurs daily. The problem has been waxing and waning since onset. The pain is present in the gluteal, lumbar spine, sacro-iliac and thoracic spine. The quality of the pain is described as aching and stabbing. The pain does not radiate. The symptoms are aggravated by bending, twisting and standing. Stiffness is present In the morning.   Neck Pain   This is a recurrent problem. The current episode started more than 1 month ago. The problem occurs daily. The problem has been waxing and waning. The pain is present in the left side, midline and right side. The quality of the pain is described as aching and stabbing. The symptoms are aggravated by bending, position, stress and twisting. The " pain is Worse during the day.     History reviewed. No pertinent past medical history.   The following portions of the patient's history were reviewed and updated as appropriate: allergies, past family history, past medical history, past social history, past surgical history, and problem list.  Review of Systems   Musculoskeletal:  Positive for back pain and neck pain.     Physical Exam  Neck:        Comments: Pnful and limited in [place], Ext, Brot, Llf  Musculoskeletal:      Cervical back: Pain with movement and muscular tenderness present. Decreased range of motion.      Thoracic back: Spasms and tenderness present. Decreased range of motion.      Lumbar back: Spasms and tenderness present. Decreased range of motion. Negative right straight leg raise test and negative left straight leg raise test.        Back:       Comments: Pnful and limited in Brot, Llf, Ext   Lymphadenopathy:      Cervical: No cervical adenopathy.   Skin:     General: Skin is warm and dry.   Neurological:      Mental Status: She is alert and oriented to person, place, and time.      Gait: Gait is intact.   Psychiatric:         Mood and Affect: Mood and affect normal.         Behavior: Behavior normal.       SOFT TISSUE ASSESSMENT: Hypertonicity and tenderness palpated B C5-T6. L5/S1 erector spinae, L glutr edmin upper traps, lev scap, SCM, scalene, rhomboid, suboccipitals JOINT RECTRICTIONS: C5-T, L5/S1 and L SIJ     Return in about 2 weeks (around 5/28/2024) for Next scheduled follow up.

## 2024-06-11 ENCOUNTER — PROCEDURE VISIT (OUTPATIENT)
Age: 34
End: 2024-06-11
Payer: MEDICARE

## 2024-06-11 VITALS
HEIGHT: 65 IN | BODY MASS INDEX: 27.66 KG/M2 | WEIGHT: 166 LBS | DIASTOLIC BLOOD PRESSURE: 66 MMHG | HEART RATE: 96 BPM | OXYGEN SATURATION: 99 % | SYSTOLIC BLOOD PRESSURE: 120 MMHG

## 2024-06-11 DIAGNOSIS — S16.1XXA CERVICAL MYOFASCIAL STRAIN, INITIAL ENCOUNTER: Primary | ICD-10-CM

## 2024-06-11 DIAGNOSIS — M99.03 SEGMENTAL DYSFUNCTION OF LUMBAR REGION: ICD-10-CM

## 2024-06-11 DIAGNOSIS — M99.02 SEGMENTAL DYSFUNCTION OF THORACIC REGION: ICD-10-CM

## 2024-06-11 DIAGNOSIS — S33.6XXA SPRAIN OF SACROILIAC REGION, INITIAL ENCOUNTER: ICD-10-CM

## 2024-06-11 PROCEDURE — 98941 CHIROPRACT MANJ 3-4 REGIONS: CPT | Performed by: CHIROPRACTOR

## 2024-06-11 NOTE — PROGRESS NOTES
"Initial date of service: 11/9/23    Diagnoses and all orders for this visit:    Cervical myofascial strain, initial encounter    Sprain of sacroiliac region, initial encounter - Left    Segmental dysfunction of thoracic region    Segmental dysfunction of lumbar region    Pt suffered exacerbation but responded to tx with reduced pain and increased ROM    TREATMENT: [id number]  Ther-ex: IASTM - discussed post procedure soreness and/or ecchymosis for up to 36 hrs, applied to affected mm hypertonicities; wall cullen, axial retraction, upper trap stretch, lev scap stretch, SCM stretch, glute stretch. Thoracic mobilization/manipulation: prone P-A mob, supine A-P manip; cervical mobilization/manipulation: traction, diversified supine graded mobilization, lumbar side laying graded mobs; L SI LAT    HPI:  Alma Delia Hall is a 34 y.o. female   Chief Complaint   Patient presents with    Neck Pain     Neck pain-4    Back Pain     Lower back pain-4     Pt presents for tx for neck/back pain. Pt reports increasing episodes of \"throwing back or neck out\". Pt has undergone chiro care with benefit in the past. Pt had baby early 2023 6/11: Pt reports moving and feeling better since last tx but suffered flare-up    Back Pain  This is a recurrent problem. The current episode started more than 1 month ago. The problem occurs daily. The problem has been waxing and waning since onset. The pain is present in the gluteal, lumbar spine, sacro-iliac and thoracic spine. The quality of the pain is described as aching and stabbing. The pain does not radiate. The symptoms are aggravated by bending, twisting and standing. Stiffness is present In the morning.   Neck Pain   This is a recurrent problem. The current episode started more than 1 month ago. The problem occurs daily. The problem has been waxing and waning. The pain is present in the left side, midline and right side. The quality of the pain is described as aching and stabbing. The symptoms are " aggravated by bending, position, stress and twisting. The pain is Worse during the day.     History reviewed. No pertinent past medical history.   The following portions of the patient's history were reviewed and updated as appropriate: allergies, past family history, past medical history, past social history, past surgical history, and problem list.  Review of Systems   Musculoskeletal:  Positive for back pain and neck pain.     Physical Exam  Neck:        Comments: Pnful and limited in [place], Ext, Brot, Llf  Musculoskeletal:      Cervical back: Pain with movement and muscular tenderness present. Decreased range of motion.      Thoracic back: Spasms and tenderness present. Decreased range of motion.      Lumbar back: Spasms and tenderness present. Decreased range of motion. Negative right straight leg raise test and negative left straight leg raise test.        Back:       Comments: Pnful and limited in Brot, Llf, Ext   Lymphadenopathy:      Cervical: No cervical adenopathy.   Skin:     General: Skin is warm and dry.   Neurological:      Mental Status: She is alert and oriented to person, place, and time.      Gait: Gait is intact.   Psychiatric:         Mood and Affect: Mood and affect normal.         Behavior: Behavior normal.     SOFT TISSUE ASSESSMENT: Hypertonicity and tenderness palpated B C5-T6. L5/S1 erector spinae, L glutr edmin upper traps, lev scap, SCM, scalene, rhomboid, suboccipitals JOINT RECTRICTIONS: C5-T, L5/S1 and L SIJ     Return in about 3 weeks (around 7/2/2024) for Next scheduled follow up.

## 2024-06-27 ENCOUNTER — PROCEDURE VISIT (OUTPATIENT)
Age: 34
End: 2024-06-27
Payer: MEDICARE

## 2024-06-27 VITALS
WEIGHT: 166 LBS | HEART RATE: 87 BPM | OXYGEN SATURATION: 99 % | SYSTOLIC BLOOD PRESSURE: 118 MMHG | DIASTOLIC BLOOD PRESSURE: 68 MMHG | BODY MASS INDEX: 27.66 KG/M2 | HEIGHT: 65 IN

## 2024-06-27 DIAGNOSIS — S33.6XXA SPRAIN OF SACROILIAC REGION, INITIAL ENCOUNTER: ICD-10-CM

## 2024-06-27 DIAGNOSIS — S16.1XXA CERVICAL MYOFASCIAL STRAIN, INITIAL ENCOUNTER: Primary | ICD-10-CM

## 2024-06-27 DIAGNOSIS — M99.02 SEGMENTAL DYSFUNCTION OF THORACIC REGION: ICD-10-CM

## 2024-06-27 DIAGNOSIS — M99.03 SEGMENTAL DYSFUNCTION OF LUMBAR REGION: ICD-10-CM

## 2024-06-27 PROCEDURE — 98941 CHIROPRACT MANJ 3-4 REGIONS: CPT | Performed by: CHIROPRACTOR

## 2024-06-27 NOTE — PROGRESS NOTES
"Initial date of service: 11/9/23    Diagnoses and all orders for this visit:    Cervical myofascial strain, initial encounter    Sprain of sacroiliac region, initial encounter - Left    Segmental dysfunction of thoracic region    Segmental dysfunction of lumbar region    Pt suffered exacerbation but responded to tx with reduced pain and increased ROM    TREATMENT: [id number]  Ther-ex: IASTM - discussed post procedure soreness and/or ecchymosis for up to 36 hrs, applied to affected mm hypertonicities; wall cullen, axial retraction, upper trap stretch, lev scap stretch, SCM stretch, glute stretch. Thoracic mobilization/manipulation: prone P-A mob, supine A-P manip; cervical mobilization/manipulation: traction, diversified supine graded mobilization, lumbar side laying graded mobs; L SI LAT    HPI:  Alma Delia Hall is a 34 y.o. female   Chief Complaint   Patient presents with    Neck Pain     Neck pain-1    Back Pain     Lower back pain-6     Pt presents for tx for neck/back pain. Pt reports increasing episodes of \"throwing back or neck out\". Pt has undergone chiro care with benefit in the past. Pt had baby early 2023 6/27: Pt reports feels better when she's consistent with HEP    Back Pain  This is a recurrent problem. The current episode started more than 1 month ago. The problem occurs daily. The problem has been waxing and waning since onset. The pain is present in the gluteal, lumbar spine, sacro-iliac and thoracic spine. The quality of the pain is described as aching and stabbing. The pain does not radiate. The symptoms are aggravated by bending, twisting and standing. Stiffness is present In the morning.   Neck Pain   This is a recurrent problem. The current episode started more than 1 month ago. The problem occurs daily. The problem has been waxing and waning. The pain is present in the left side, midline and right side. The quality of the pain is described as aching and stabbing. The symptoms are aggravated by bending, " position, stress and twisting. The pain is Worse during the day.     History reviewed. No pertinent past medical history.   The following portions of the patient's history were reviewed and updated as appropriate: allergies, past family history, past medical history, past social history, past surgical history, and problem list.  Review of Systems   Musculoskeletal:  Positive for back pain and neck pain.     Physical Exam  Neck:        Comments: Pnful and limited in [place], Ext, Brot, Llf  Musculoskeletal:      Cervical back: Pain with movement and muscular tenderness present. Decreased range of motion.      Thoracic back: Spasms and tenderness present. Decreased range of motion.      Lumbar back: Spasms and tenderness present. Decreased range of motion. Negative right straight leg raise test and negative left straight leg raise test.        Back:       Comments: Pnful and limited in Brot, Llf, Ext   Lymphadenopathy:      Cervical: No cervical adenopathy.   Skin:     General: Skin is warm and dry.   Neurological:      Mental Status: She is alert and oriented to person, place, and time.      Gait: Gait is intact.   Psychiatric:         Mood and Affect: Mood and affect normal.         Behavior: Behavior normal.       SOFT TISSUE ASSESSMENT: Hypertonicity and tenderness palpated B C5-T6. L5/S1 erector spinae, L glutr edmin upper traps, lev scap, SCM, scalene, rhomboid, suboccipitals JOINT RECTRICTIONS: C5-T, L5/S1 and L SIJ     Return in about 2 weeks (around 7/11/2024) for Next scheduled follow up.

## 2024-07-18 ENCOUNTER — PROCEDURE VISIT (OUTPATIENT)
Age: 34
End: 2024-07-18
Payer: MEDICARE

## 2024-07-18 VITALS
DIASTOLIC BLOOD PRESSURE: 70 MMHG | HEART RATE: 93 BPM | OXYGEN SATURATION: 99 % | WEIGHT: 166 LBS | BODY MASS INDEX: 27.66 KG/M2 | SYSTOLIC BLOOD PRESSURE: 116 MMHG | HEIGHT: 65 IN

## 2024-07-18 DIAGNOSIS — S16.1XXA CERVICAL MYOFASCIAL STRAIN, INITIAL ENCOUNTER: Primary | ICD-10-CM

## 2024-07-18 DIAGNOSIS — M99.02 SEGMENTAL DYSFUNCTION OF THORACIC REGION: ICD-10-CM

## 2024-07-18 DIAGNOSIS — M99.03 SEGMENTAL DYSFUNCTION OF LUMBAR REGION: ICD-10-CM

## 2024-07-18 DIAGNOSIS — S33.6XXA SPRAIN OF SACROILIAC REGION, INITIAL ENCOUNTER: ICD-10-CM

## 2024-07-18 PROCEDURE — 98941 CHIROPRACT MANJ 3-4 REGIONS: CPT | Performed by: CHIROPRACTOR

## 2024-07-18 NOTE — PROGRESS NOTES
"Initial date of service: 11/9/23    Diagnoses and all orders for this visit:    Cervical myofascial strain, initial encounter    Sprain of sacroiliac region, initial encounter - Left    Segmental dysfunction of thoracic region    Segmental dysfunction of lumbar region    Pt suffered exacerbation but responded to tx with reduced pain and increased ROM    TREATMENT: [id number]  Ther-ex: IASTM - discussed post procedure soreness and/or ecchymosis for up to 36 hrs, applied to affected mm hypertonicities; wall cullen, axial retraction, upper trap stretch, lev scap stretch, SCM stretch, glute stretch. Thoracic mobilization/manipulation: prone P-A mob, supine A-P manip; cervical mobilization/manipulation: traction, diversified supine graded mobilization, lumbar side laying graded mobs; L SI LAT    HPI:  Alma Delia Hall is a 34 y.o. female   Chief Complaint   Patient presents with    Back Pain     Lower back pain-1    Neck Pain     Neck pain-1     Pt presents for tx for neck/back pain. Pt reports increasing episodes of \"throwing back or neck out\". Pt has undergone chiro care with benefit in the past. Pt had baby early 2023 7/18: Pt reports feels better when she's consistent with HEP; due date for new baby end of Sept    Back Pain  This is a recurrent problem. The current episode started more than 1 month ago. The problem occurs daily. The problem has been waxing and waning since onset. The pain is present in the gluteal, lumbar spine, sacro-iliac and thoracic spine. The quality of the pain is described as aching and stabbing. The pain does not radiate. The symptoms are aggravated by bending, twisting and standing. Stiffness is present In the morning.   Neck Pain   This is a recurrent problem. The current episode started more than 1 month ago. The problem occurs daily. The problem has been waxing and waning. The pain is present in the left side, midline and right side. The quality of the pain is described as aching and stabbing. The " symptoms are aggravated by bending, position, stress and twisting. The pain is Worse during the day.     History reviewed. No pertinent past medical history.   The following portions of the patient's history were reviewed and updated as appropriate: allergies, past family history, past medical history, past social history, past surgical history, and problem list.  Review of Systems   Musculoskeletal:  Positive for back pain and neck pain.     Physical Exam  Neck:        Comments: Pnful and limited in [place], Ext, Brot, Llf  Musculoskeletal:      Cervical back: Pain with movement and muscular tenderness present. Decreased range of motion.      Thoracic back: Spasms and tenderness present. Decreased range of motion.      Lumbar back: Spasms and tenderness present. Decreased range of motion. Negative right straight leg raise test and negative left straight leg raise test.        Back:       Comments: Pnful and limited in Brot, Llf, Ext   Lymphadenopathy:      Cervical: No cervical adenopathy.   Skin:     General: Skin is warm and dry.   Neurological:      Mental Status: She is alert and oriented to person, place, and time.      Gait: Gait is intact.   Psychiatric:         Mood and Affect: Mood and affect normal.         Behavior: Behavior normal.       SOFT TISSUE ASSESSMENT: Hypertonicity and tenderness palpated B C5-T6. L5/S1 erector spinae, L glutr edmin upper traps, lev scap, SCM, scalene, rhomboid, suboccipitals JOINT RECTRICTIONS: C5-T, L5/S1 and L SIJ     Return in about 2 weeks (around 8/1/2024) for Next scheduled follow up.

## 2024-08-06 ENCOUNTER — PROCEDURE VISIT (OUTPATIENT)
Age: 34
End: 2024-08-06
Payer: MEDICARE

## 2024-08-06 VITALS
WEIGHT: 166 LBS | HEIGHT: 65 IN | BODY MASS INDEX: 27.66 KG/M2 | HEART RATE: 90 BPM | SYSTOLIC BLOOD PRESSURE: 118 MMHG | OXYGEN SATURATION: 99 % | DIASTOLIC BLOOD PRESSURE: 66 MMHG

## 2024-08-06 DIAGNOSIS — M99.03 SEGMENTAL DYSFUNCTION OF LUMBAR REGION: ICD-10-CM

## 2024-08-06 DIAGNOSIS — M99.02 SEGMENTAL DYSFUNCTION OF THORACIC REGION: ICD-10-CM

## 2024-08-06 DIAGNOSIS — S16.1XXA CERVICAL MYOFASCIAL STRAIN, INITIAL ENCOUNTER: Primary | ICD-10-CM

## 2024-08-06 DIAGNOSIS — S33.6XXA SPRAIN OF SACROILIAC REGION, INITIAL ENCOUNTER: ICD-10-CM

## 2024-08-06 PROCEDURE — 98941 CHIROPRACT MANJ 3-4 REGIONS: CPT | Performed by: CHIROPRACTOR

## 2024-08-06 NOTE — PROGRESS NOTES
"Initial date of service: 11/9/23    Diagnoses and all orders for this visit:    Cervical myofascial strain, initial encounter    Sprain of sacroiliac region, initial encounter - Left    Segmental dysfunction of thoracic region    Segmental dysfunction of lumbar region    Pt suffered exacerbation but responded to tx with reduced pain and increased ROM    TREATMENT: [id number]  Ther-ex: IASTM - discussed post procedure soreness and/or ecchymosis for up to 36 hrs, applied to affected mm hypertonicities; wall cullen, axial retraction, upper trap stretch, lev scap stretch, SCM stretch, glute stretch. Thoracic mobilization/manipulation: prone P-A mob, supine A-P manip; cervical mobilization/manipulation: traction, diversified supine graded mobilization, lumbar side laying graded mobs; L SI LAT    HPI:  Alma Delia Hall is a 34 y.o. female   Chief Complaint   Patient presents with    Neck Pain     Neck pain-2    Back Pain     Lower back pain-2     Pt presents for tx for neck/back pain. Pt reports increasing episodes of \"throwing back or neck out\". Pt has undergone chiro care with benefit in the past. Pt had baby early 2023 8/6: Pt reports feels better when she's consistent with HEP; due date for new baby end of Sept    Back Pain  This is a recurrent problem. The current episode started more than 1 month ago. The problem occurs daily. The problem has been waxing and waning since onset. The pain is present in the gluteal, lumbar spine, sacro-iliac and thoracic spine. The quality of the pain is described as aching and stabbing. The pain does not radiate. The symptoms are aggravated by bending, twisting and standing. Stiffness is present In the morning.   Neck Pain   This is a recurrent problem. The current episode started more than 1 month ago. The problem occurs daily. The problem has been waxing and waning. The pain is present in the left side, midline and right side. The quality of the pain is described as aching and stabbing. The " symptoms are aggravated by bending, position, stress and twisting. The pain is Worse during the day.     History reviewed. No pertinent past medical history.   The following portions of the patient's history were reviewed and updated as appropriate: allergies, past family history, past medical history, past social history, past surgical history, and problem list.  Review of Systems   Musculoskeletal:  Positive for back pain and neck pain.     Physical Exam  Neck:        Comments: Pnful and limited in [place], Ext, Brot, Llf  Musculoskeletal:      Cervical back: Pain with movement and muscular tenderness present. Decreased range of motion.      Thoracic back: Spasms and tenderness present. Decreased range of motion.      Lumbar back: Spasms and tenderness present. Decreased range of motion. Negative right straight leg raise test and negative left straight leg raise test.        Back:       Comments: Pnful and limited in Brot, Llf, Ext   Lymphadenopathy:      Cervical: No cervical adenopathy.   Skin:     General: Skin is warm and dry.   Neurological:      Mental Status: She is alert and oriented to person, place, and time.      Gait: Gait is intact.   Psychiatric:         Mood and Affect: Mood and affect normal.         Behavior: Behavior normal.       SOFT TISSUE ASSESSMENT: Hypertonicity and tenderness palpated B C5-T6. L5/S1 erector spinae, L glutr edmin upper traps, lev scap, SCM, scalene, rhomboid, suboccipitals JOINT RECTRICTIONS: C5-T, L5/S1 and L SIJ     Return in about 1 week (around 8/13/2024).

## 2024-08-20 ENCOUNTER — PROCEDURE VISIT (OUTPATIENT)
Age: 34
End: 2024-08-20
Payer: MEDICARE

## 2024-08-20 VITALS
OXYGEN SATURATION: 99 % | HEART RATE: 96 BPM | BODY MASS INDEX: 27.66 KG/M2 | WEIGHT: 166 LBS | DIASTOLIC BLOOD PRESSURE: 72 MMHG | SYSTOLIC BLOOD PRESSURE: 126 MMHG | HEIGHT: 65 IN

## 2024-08-20 DIAGNOSIS — M99.02 SEGMENTAL DYSFUNCTION OF THORACIC REGION: ICD-10-CM

## 2024-08-20 DIAGNOSIS — S16.1XXA CERVICAL MYOFASCIAL STRAIN, INITIAL ENCOUNTER: Primary | ICD-10-CM

## 2024-08-20 DIAGNOSIS — M99.03 SEGMENTAL DYSFUNCTION OF LUMBAR REGION: ICD-10-CM

## 2024-08-20 DIAGNOSIS — S33.6XXA SPRAIN OF SACROILIAC REGION, INITIAL ENCOUNTER: ICD-10-CM

## 2024-08-20 PROCEDURE — 98941 CHIROPRACT MANJ 3-4 REGIONS: CPT | Performed by: CHIROPRACTOR

## 2024-08-20 NOTE — PROGRESS NOTES
"Initial date of service: 11/9/23    Diagnoses and all orders for this visit:    Cervical myofascial strain, initial encounter    Sprain of sacroiliac region, initial encounter - Left    Segmental dysfunction of thoracic region    Segmental dysfunction of lumbar region    Pt suffered exacerbation but responded to tx with reduced pain and increased ROM    TREATMENT: [id number]  Ther-ex: IASTM - discussed post procedure soreness and/or ecchymosis for up to 36 hrs, applied to affected mm hypertonicities; wall cullen, axial retraction, upper trap stretch, lev scap stretch, SCM stretch, glute stretch. Thoracic mobilization/manipulation: prone P-A mob, supine A-P manip; cervical mobilization/manipulation: traction, diversified supine graded mobilization, lumbar side laying graded mobs; L SI LAT    HPI:  Alma Delia Hall is a 34 y.o. female   Chief Complaint   Patient presents with    Neck Pain     Neck pain-5    Back Pain     Lower back pain-1     Pt presents for tx for neck/back pain. Pt reports increasing episodes of \"throwing back or neck out\". Pt has undergone chiro care with benefit in the past. Pt had baby early 2023 8/20: Pt reports suffering flare-up of neck pain while stretching oddly this morning; due date for new baby end of Sept    Back Pain  This is a recurrent problem. The current episode started more than 1 month ago. The problem occurs daily. The problem has been waxing and waning since onset. The pain is present in the gluteal, lumbar spine, sacro-iliac and thoracic spine. The quality of the pain is described as aching and stabbing. The pain does not radiate. The symptoms are aggravated by bending, twisting and standing. Stiffness is present In the morning.   Neck Pain   This is a recurrent problem. The current episode started more than 1 month ago. The problem occurs daily. The problem has been waxing and waning. The pain is present in the left side, midline and right side. The quality of the pain is described as " aching and stabbing. The symptoms are aggravated by bending, position, stress and twisting. The pain is Worse during the day.     History reviewed. No pertinent past medical history.   The following portions of the patient's history were reviewed and updated as appropriate: allergies, past family history, past medical history, past social history, past surgical history, and problem list.  Review of Systems   Musculoskeletal:  Positive for back pain and neck pain.     Physical Exam  Neck:        Comments: Pnful and limited in [place], Ext, Brot, Llf  Musculoskeletal:      Cervical back: Pain with movement and muscular tenderness present. Decreased range of motion.      Thoracic back: Spasms and tenderness present. Decreased range of motion.      Lumbar back: Spasms and tenderness present. Decreased range of motion. Negative right straight leg raise test and negative left straight leg raise test.        Back:       Comments: Pnful and limited in Brot, Llf, Ext   Lymphadenopathy:      Cervical: No cervical adenopathy.   Skin:     General: Skin is warm and dry.   Neurological:      Mental Status: She is alert and oriented to person, place, and time.      Gait: Gait is intact.   Psychiatric:         Mood and Affect: Mood and affect normal.         Behavior: Behavior normal.       SOFT TISSUE ASSESSMENT: Hypertonicity and tenderness palpated B C5-T6. L5/S1 erector spinae, L glutr edmin upper traps, lev scap, SCM, scalene, rhomboid, suboccipitals JOINT RECTRICTIONS: C5-T, L5/S1 and L SIJ     Return in about 2 weeks (around 9/3/2024) for Next scheduled follow up.

## 2024-09-03 ENCOUNTER — PROCEDURE VISIT (OUTPATIENT)
Age: 34
End: 2024-09-03
Payer: MEDICARE

## 2024-09-03 VITALS
HEART RATE: 94 BPM | HEIGHT: 65 IN | OXYGEN SATURATION: 99 % | DIASTOLIC BLOOD PRESSURE: 72 MMHG | BODY MASS INDEX: 27.66 KG/M2 | SYSTOLIC BLOOD PRESSURE: 126 MMHG | WEIGHT: 166 LBS

## 2024-09-03 DIAGNOSIS — S16.1XXA CERVICAL MYOFASCIAL STRAIN, INITIAL ENCOUNTER: Primary | ICD-10-CM

## 2024-09-03 DIAGNOSIS — M99.02 SEGMENTAL DYSFUNCTION OF THORACIC REGION: ICD-10-CM

## 2024-09-03 DIAGNOSIS — S33.6XXA SPRAIN OF SACROILIAC REGION, INITIAL ENCOUNTER: ICD-10-CM

## 2024-09-03 DIAGNOSIS — M99.03 SEGMENTAL DYSFUNCTION OF LUMBAR REGION: ICD-10-CM

## 2024-09-03 PROCEDURE — 98941 CHIROPRACT MANJ 3-4 REGIONS: CPT | Performed by: CHIROPRACTOR

## 2024-09-03 NOTE — PROGRESS NOTES
"Initial date of service: 11/9/23    Diagnoses and all orders for this visit:    Cervical myofascial strain, initial encounter    Sprain of sacroiliac region, initial encounter - Left    Segmental dysfunction of thoracic region    Segmental dysfunction of lumbar region    Pt suffered exacerbation but responded to tx with reduced pain and increased ROM    TREATMENT: [id number]  Ther-ex: IASTM - discussed post procedure soreness and/or ecchymosis for up to 36 hrs, applied to affected mm hypertonicities; wall cullen, axial retraction, upper trap stretch, lev scap stretch, SCM stretch, glute stretch. Thoracic mobilization/manipulation: prone P-A mob, supine A-P manip; cervical mobilization/manipulation: traction, diversified supine graded mobilization, lumbar side laying graded mobs; L SI LAT    HPI:  Alma Delia Hall is a 34 y.o. female   Chief Complaint   Patient presents with    Neck Pain     Neck pain-4    Back Pain     Lower back pain-4     Pt presents for tx for neck/back pain. Pt reports increasing episodes of \"throwing back or neck out\". Pt has undergone chiro care with benefit in the past. Pt had baby early 2023  9/3: Pt reports a vist or so left before baby is due    Back Pain  This is a recurrent problem. The current episode started more than 1 month ago. The problem occurs daily. The problem has been waxing and waning since onset. The pain is present in the gluteal, lumbar spine, sacro-iliac and thoracic spine. The quality of the pain is described as aching and stabbing. The pain does not radiate. The symptoms are aggravated by bending, twisting and standing. Stiffness is present In the morning.   Neck Pain   This is a recurrent problem. The current episode started more than 1 month ago. The problem occurs daily. The problem has been waxing and waning. The pain is present in the left side, midline and right side. The quality of the pain is described as aching and stabbing. The symptoms are aggravated by bending, position, " stress and twisting. The pain is Worse during the day.     History reviewed. No pertinent past medical history.   The following portions of the patient's history were reviewed and updated as appropriate: allergies, past family history, past medical history, past social history, past surgical history, and problem list.  Review of Systems   Musculoskeletal:  Positive for back pain and neck pain.     Physical Exam  Neck:        Comments: Pnful and limited in [place], Ext, Brot, Llf  Musculoskeletal:      Cervical back: Pain with movement and muscular tenderness present. Decreased range of motion.      Thoracic back: Spasms and tenderness present. Decreased range of motion.      Lumbar back: Spasms and tenderness present. Decreased range of motion. Negative right straight leg raise test and negative left straight leg raise test.        Back:       Comments: Pnful and limited in Brot, Llf, Ext   Lymphadenopathy:      Cervical: No cervical adenopathy.   Skin:     General: Skin is warm and dry.   Neurological:      Mental Status: She is alert and oriented to person, place, and time.      Gait: Gait is intact.   Psychiatric:         Mood and Affect: Mood and affect normal.         Behavior: Behavior normal.     SOFT TISSUE ASSESSMENT: Hypertonicity and tenderness palpated B C5-T6. L5/S1 erector spinae, L glutr edmin upper traps, lev scap, SCM, scalene, rhomboid, suboccipitals JOINT RECTRICTIONS: C5-T, L5/S1 and L SIJ     Return in about 1 week (around 9/10/2024) for Next scheduled follow up.

## 2024-09-19 ENCOUNTER — TELEPHONE (OUTPATIENT)
Dept: OBGYN CLINIC | Facility: HOSPITAL | Age: 34
End: 2024-09-19

## 2024-09-19 NOTE — TELEPHONE ENCOUNTER
Caller: Gab MARTINES    Doctor: Dr. Wayne Augustin    Reason for call: Calling concerning referral that was faxed, thinking it may be a duplicate, and would like to speak to someone. Kat explained the fax from today was from Jose C Castillo and the one from Monday was from a Angela Richard.    Would like to speak to someone has some questions.     Call back#: [phone]

## 2024-09-19 NOTE — TELEPHONE ENCOUNTER
LMOM  for Kat MARTINES from [facility] to explain second auth was submitted because patient was initially scheduled and auth was submitted for wrong doctor. Patient was put on correct schedule and a second auth was submitted for the correct doctor.

## 2024-10-08 ENCOUNTER — TELEPHONE (OUTPATIENT)
Dept: FAMILY MEDICINE CLINIC | Facility: CLINIC | Age: 34
End: 2024-10-08

## 2024-10-22 NOTE — TELEPHONE ENCOUNTER
10/22/24 10:19 AM        The office's request has been received, reviewed, and the patient chart updated. The PCP has successfully been removed with a patient attribution note. This message will now be completed.        Thank you  Ramila Burgess

## 2024-11-07 ENCOUNTER — PROCEDURE VISIT (OUTPATIENT)
Age: 34
End: 2024-11-07
Payer: MEDICARE

## 2024-11-07 VITALS — HEIGHT: 65 IN | BODY MASS INDEX: 27.66 KG/M2 | WEIGHT: 166.01 LBS

## 2024-11-07 DIAGNOSIS — S33.6XXA SPRAIN OF SACROILIAC REGION, INITIAL ENCOUNTER: ICD-10-CM

## 2024-11-07 DIAGNOSIS — M99.03 SEGMENTAL DYSFUNCTION OF LUMBAR REGION: ICD-10-CM

## 2024-11-07 DIAGNOSIS — S16.1XXA CERVICAL MYOFASCIAL STRAIN, INITIAL ENCOUNTER: Primary | ICD-10-CM

## 2024-11-07 DIAGNOSIS — M99.02 SEGMENTAL DYSFUNCTION OF THORACIC REGION: ICD-10-CM

## 2024-11-07 PROCEDURE — 98941 CHIROPRACT MANJ 3-4 REGIONS: CPT | Performed by: CHIROPRACTOR

## 2024-11-07 NOTE — PROGRESS NOTES
"Initial date of service: 11/9/23    Diagnoses and all orders for this visit:    Cervical myofascial strain, initial encounter    Sprain of sacroiliac region, initial encounter - Left    Segmental dysfunction of thoracic region    Segmental dysfunction of lumbar region    Pt suffered exacerbation but responded to tx with reduced pain and increased ROM    TREATMENT: [id number]  Ther-ex: IASTM - discussed post procedure soreness and/or ecchymosis for up to 36 hrs, applied to affected mm hypertonicities; wall cullen, axial retraction, upper trap stretch, lev scap stretch, SCM stretch, glute stretch. Thoracic mobilization/manipulation: prone P-A mob, supine A-P manip; cervical mobilization/manipulation: traction, diversified supine graded mobilization, lumbar side laying graded mobs; L SI LAT    HPI:  Alma Delia Hall is a 34 y.o. female   Chief Complaint   Patient presents with    Spine - Follow-up     Neck pain and lower back pain     Pt presents for tx for neck/back pain. Pt reports increasing episodes of \"throwing back or neck out\". Pt has undergone chiro care with benefit in the past. Pt had baby early 2023 11/7: Pt reports flare-up with posture/ergonomics of caring for new baby    Back Pain  This is a recurrent problem. The current episode started more than 1 month ago. The problem occurs daily. The problem has been waxing and waning since onset. The pain is present in the gluteal, lumbar spine, sacro-iliac and thoracic spine. The quality of the pain is described as aching and stabbing. The pain does not radiate. The symptoms are aggravated by bending, twisting and standing. Stiffness is present In the morning.   Neck Pain   This is a recurrent problem. The current episode started more than 1 month ago. The problem occurs daily. The problem has been waxing and waning. The pain is present in the left side, midline and right side. The quality of the pain is described as aching and stabbing. The symptoms are aggravated by " bending, position, stress and twisting. The pain is Worse during the day.     History reviewed. No pertinent past medical history.   The following portions of the patient's history were reviewed and updated as appropriate: allergies, past family history, past medical history, past social history, past surgical history, and problem list.  Review of Systems   Musculoskeletal:  Positive for back pain and neck pain.     Physical Exam  Neck:        Comments: Pnful and limited in [place], Ext, Brot, Llf  Musculoskeletal:      Cervical back: Pain with movement and muscular tenderness present. Decreased range of motion.      Thoracic back: Spasms and tenderness present. Decreased range of motion.      Lumbar back: Spasms and tenderness present. Decreased range of motion. Negative right straight leg raise test and negative left straight leg raise test.        Back:       Comments: Pnful and limited in Brot, Llf, Ext   Lymphadenopathy:      Cervical: No cervical adenopathy.   Skin:     General: Skin is warm and dry.   Neurological:      Mental Status: She is alert and oriented to person, place, and time.      Gait: Gait is intact.   Psychiatric:         Mood and Affect: Mood and affect normal.         Behavior: Behavior normal.       SOFT TISSUE ASSESSMENT: Hypertonicity and tenderness palpated B C5-T6. L5/S1 erector spinae, L glutr edmin upper traps, lev scap, SCM, scalene, rhomboid, suboccipitals JOINT RECTRICTIONS: C5-T, L5/S1 and L SIJ     Return in about 2 weeks (around 11/21/2024) for Next scheduled follow up.

## 2024-11-14 ENCOUNTER — PROCEDURE VISIT (OUTPATIENT)
Age: 34
End: 2024-11-14
Payer: MEDICARE

## 2024-11-14 VITALS
SYSTOLIC BLOOD PRESSURE: 112 MMHG | HEART RATE: 100 BPM | BODY MASS INDEX: 27.66 KG/M2 | HEIGHT: 65 IN | DIASTOLIC BLOOD PRESSURE: 66 MMHG | WEIGHT: 166 LBS | OXYGEN SATURATION: 98 %

## 2024-11-14 DIAGNOSIS — M99.03 SEGMENTAL DYSFUNCTION OF LUMBAR REGION: ICD-10-CM

## 2024-11-14 DIAGNOSIS — S33.6XXA SPRAIN OF SACROILIAC REGION, INITIAL ENCOUNTER: ICD-10-CM

## 2024-11-14 DIAGNOSIS — M99.02 SEGMENTAL DYSFUNCTION OF THORACIC REGION: ICD-10-CM

## 2024-11-14 DIAGNOSIS — S16.1XXA CERVICAL MYOFASCIAL STRAIN, INITIAL ENCOUNTER: Primary | ICD-10-CM

## 2024-11-14 PROCEDURE — 98941 CHIROPRACT MANJ 3-4 REGIONS: CPT | Performed by: CHIROPRACTOR

## 2024-11-14 NOTE — PROGRESS NOTES
"Initial date of service: 11/9/23    Diagnoses and all orders for this visit:    Cervical myofascial strain, initial encounter    Sprain of sacroiliac region, initial encounter - Left    Segmental dysfunction of thoracic region    Segmental dysfunction of lumbar region    Pt suffered exacerbation but responded to tx with reduced pain and increased ROM    TREATMENT: [id number]  Ther-ex: IASTM - discussed post procedure soreness and/or ecchymosis for up to 36 hrs, applied to affected mm hypertonicities; wall cullen, axial retraction, upper trap stretch, lev scap stretch, SCM stretch, glute stretch. Thoracic mobilization/manipulation: prone P-A mob, supine A-P manip; cervical mobilization/manipulation: traction, diversified supine graded mobilization, lumbar side laying graded mobs; L SI LAT    HPI:  Alma Delia Hall is a 34 y.o. female   Chief Complaint   Patient presents with    Back Pain     Lower back pain-3    Neck Pain     Neck pain-3     Pt presents for tx for neck/back pain. Pt reports increasing episodes of \"throwing back or neck out\". Pt has undergone chiro care with benefit in the past. Pt had baby early 2023 11/14: Pt reports feeling and moving better since last tx    Back Pain  This is a recurrent problem. The current episode started more than 1 month ago. The problem occurs daily. The problem has been waxing and waning since onset. The pain is present in the gluteal, lumbar spine, sacro-iliac and thoracic spine. The quality of the pain is described as aching and stabbing. The pain does not radiate. The symptoms are aggravated by bending, twisting and standing. Stiffness is present In the morning.   Neck Pain   This is a recurrent problem. The current episode started more than 1 month ago. The problem occurs daily. The problem has been waxing and waning. The pain is present in the left side, midline and right side. The quality of the pain is described as aching and stabbing. The symptoms are aggravated by bending, " position, stress and twisting. The pain is Worse during the day.     History reviewed. No pertinent past medical history.   The following portions of the patient's history were reviewed and updated as appropriate: allergies, past family history, past medical history, past social history, past surgical history, and problem list.  Review of Systems   Musculoskeletal:  Positive for back pain and neck pain.     Physical Exam  Neck:        Comments: Pnful and limited in [place], Ext, Brot, Llf  Musculoskeletal:      Cervical back: Pain with movement and muscular tenderness present. Decreased range of motion.      Thoracic back: Spasms and tenderness present. Decreased range of motion.      Lumbar back: Spasms and tenderness present. Decreased range of motion. Negative right straight leg raise test and negative left straight leg raise test.        Back:       Comments: Pnful and limited in Brot, Llf, Ext   Lymphadenopathy:      Cervical: No cervical adenopathy.   Skin:     General: Skin is warm and dry.   Neurological:      Mental Status: She is alert and oriented to person, place, and time.      Gait: Gait is intact.   Psychiatric:         Mood and Affect: Mood and affect normal.         Behavior: Behavior normal.       SOFT TISSUE ASSESSMENT: Hypertonicity and tenderness palpated B C5-T6. L5/S1 erector spinae, L glutr edmin upper traps, lev scap, SCM, scalene, rhomboid, suboccipitals JOINT RECTRICTIONS: C5-T, L5/S1 and L SIJ     Return in about 2 weeks (around 11/28/2024) for Next scheduled follow up.

## 2024-11-21 ENCOUNTER — PROCEDURE VISIT (OUTPATIENT)
Age: 34
End: 2024-11-21
Payer: MEDICARE

## 2024-11-21 VITALS
HEIGHT: 65 IN | HEART RATE: 100 BPM | BODY MASS INDEX: 27.66 KG/M2 | WEIGHT: 166 LBS | DIASTOLIC BLOOD PRESSURE: 76 MMHG | OXYGEN SATURATION: 98 % | SYSTOLIC BLOOD PRESSURE: 116 MMHG

## 2024-11-21 DIAGNOSIS — S33.6XXA SPRAIN OF SACROILIAC REGION, INITIAL ENCOUNTER: ICD-10-CM

## 2024-11-21 DIAGNOSIS — M99.03 SEGMENTAL DYSFUNCTION OF LUMBAR REGION: ICD-10-CM

## 2024-11-21 DIAGNOSIS — M99.02 SEGMENTAL DYSFUNCTION OF THORACIC REGION: ICD-10-CM

## 2024-11-21 DIAGNOSIS — S16.1XXA CERVICAL MYOFASCIAL STRAIN, INITIAL ENCOUNTER: Primary | ICD-10-CM

## 2024-11-21 PROCEDURE — 98941 CHIROPRACT MANJ 3-4 REGIONS: CPT | Performed by: CHIROPRACTOR

## 2024-11-21 NOTE — PROGRESS NOTES
"Initial date of service: 11/9/23    Diagnoses and all orders for this visit:    Cervical myofascial strain, initial encounter    Sprain of sacroiliac region, initial encounter - Left    Segmental dysfunction of thoracic region    Segmental dysfunction of lumbar region    Pt suffered exacerbation but responded to tx with reduced pain and increased ROM    TREATMENT: [id number]  Ther-ex: IASTM - discussed post procedure soreness and/or ecchymosis for up to 36 hrs, applied to affected mm hypertonicities; wall cullen, axial retraction, upper trap stretch, lev scap stretch, SCM stretch, glute stretch. Thoracic mobilization/manipulation: prone P-A mob, supine A-P manip; cervical mobilization/manipulation: traction, diversified supine graded mobilization, lumbar side laying graded mobs; L SI LAT    HPI:  Alma Delia Hall is a 34 y.o. female   Chief Complaint   Patient presents with    Back Pain     Lower back pain-3    Neck Pain     Neck pain-3     Pt presents for tx for neck/back pain. Pt reports increasing episodes of \"throwing back or neck out\". Pt has undergone chiro care with benefit in the past. Pt had baby early 2023 11/21: Pt reports feeling and moving better since last tx; when she's consistent with stretches it's better but has less time with new baby    Back Pain  This is a recurrent problem. The current episode started more than 1 month ago. The problem occurs daily. The problem has been waxing and waning since onset. The pain is present in the gluteal, lumbar spine, sacro-iliac and thoracic spine. The quality of the pain is described as aching and stabbing. The pain does not radiate. The symptoms are aggravated by bending, twisting and standing. Stiffness is present In the morning.   Neck Pain   This is a recurrent problem. The current episode started more than 1 month ago. The problem occurs daily. The problem has been waxing and waning. The pain is present in the left side, midline and right side. The quality of the pain " is described as aching and stabbing. The symptoms are aggravated by bending, position, stress and twisting. The pain is Worse during the day.     History reviewed. No pertinent past medical history.   The following portions of the patient's history were reviewed and updated as appropriate: allergies, past family history, past medical history, past social history, past surgical history, and problem list.  Review of Systems   Musculoskeletal:  Positive for back pain and neck pain.     Physical Exam  Neck:        Comments: Pnful and limited in [place], Ext, Brot, Llf  Musculoskeletal:      Cervical back: Pain with movement and muscular tenderness present. Decreased range of motion.      Thoracic back: Spasms and tenderness present. Decreased range of motion.      Lumbar back: Spasms and tenderness present. Decreased range of motion. Negative right straight leg raise test and negative left straight leg raise test.        Back:       Comments: Pnful and limited in Brot, Llf, Ext   Lymphadenopathy:      Cervical: No cervical adenopathy.   Skin:     General: Skin is warm and dry.   Neurological:      Mental Status: She is alert and oriented to person, place, and time.      Gait: Gait is intact.   Psychiatric:         Mood and Affect: Mood and affect normal.         Behavior: Behavior normal.       SOFT TISSUE ASSESSMENT: Hypertonicity and tenderness palpated B C5-T6. L5/S1 erector spinae, L glutr edmin upper traps, lev scap, SCM, scalene, rhomboid, suboccipitals JOINT RECTRICTIONS: C5-T, L5/S1 and L SIJ     Return in about 3 weeks (around 12/12/2024) for Next scheduled follow up.

## 2024-12-03 ENCOUNTER — TELEPHONE (OUTPATIENT)
Age: 34
End: 2024-12-03

## 2024-12-12 ENCOUNTER — PROCEDURE VISIT (OUTPATIENT)
Age: 34
End: 2024-12-12
Payer: MEDICARE

## 2024-12-12 VITALS
DIASTOLIC BLOOD PRESSURE: 74 MMHG | BODY MASS INDEX: 27.66 KG/M2 | OXYGEN SATURATION: 98 % | HEART RATE: 89 BPM | SYSTOLIC BLOOD PRESSURE: 120 MMHG | WEIGHT: 166 LBS | HEIGHT: 65 IN

## 2024-12-12 DIAGNOSIS — M99.02 SEGMENTAL DYSFUNCTION OF THORACIC REGION: ICD-10-CM

## 2024-12-12 DIAGNOSIS — S16.1XXA CERVICAL MYOFASCIAL STRAIN, INITIAL ENCOUNTER: Primary | ICD-10-CM

## 2024-12-12 DIAGNOSIS — M99.03 SEGMENTAL DYSFUNCTION OF LUMBAR REGION: ICD-10-CM

## 2024-12-12 DIAGNOSIS — S33.6XXA SPRAIN OF SACROILIAC REGION, INITIAL ENCOUNTER: ICD-10-CM

## 2024-12-12 PROCEDURE — 98941 CHIROPRACT MANJ 3-4 REGIONS: CPT | Performed by: CHIROPRACTOR

## 2024-12-12 NOTE — PROGRESS NOTES
"Initial date of service: 11/9/23    Diagnoses and all orders for this visit:    Cervical myofascial strain, initial encounter    Sprain of sacroiliac region, initial encounter - Left    Segmental dysfunction of thoracic region    Segmental dysfunction of lumbar region    Pt suffered exacerbation but responded to tx with reduced pain and increased ROM    TREATMENT: [id number]  Ther-ex: IASTM - discussed post procedure soreness and/or ecchymosis for up to 36 hrs, applied to affected mm hypertonicities; wall cullen, axial retraction, upper trap stretch, lev scap stretch, SCM stretch, glute stretch. Thoracic mobilization/manipulation: prone P-A mob, supine A-P manip; cervical mobilization/manipulation: traction, diversified supine graded mobilization, lumbar side laying graded mobs; L SI LAT    HPI:  Alma Delia Hall is a 34 y.o. female   Chief Complaint   Patient presents with    Back Pain     Lower back pain-3    Neck Pain     Neck pain-3     Pt presents for tx for neck/back pain. Pt reports increasing episodes of \"throwing back or neck out\". Pt has undergone chiro care with benefit in the past. Pt had baby early 2023 12/12: Pt reports feeling and moving better since last tx but suffered flare-up     Back Pain  This is a recurrent problem. The current episode started more than 1 month ago. The problem occurs daily. The problem has been waxing and waning since onset. The pain is present in the gluteal, lumbar spine, sacro-iliac and thoracic spine. The quality of the pain is described as aching and stabbing. The pain does not radiate. The symptoms are aggravated by bending, twisting and standing. Stiffness is present In the morning.   Neck Pain   This is a recurrent problem. The current episode started more than 1 month ago. The problem occurs daily. The problem has been waxing and waning. The pain is present in the left side, midline and right side. The quality of the pain is described as aching and stabbing. The symptoms are " aggravated by bending, position, stress and twisting. The pain is Worse during the day.     History reviewed. No pertinent past medical history.   The following portions of the patient's history were reviewed and updated as appropriate: allergies, past family history, past medical history, past social history, past surgical history, and problem list.  Review of Systems   Musculoskeletal:  Positive for back pain and neck pain.     Physical Exam  Neck:        Comments: Pnful and limited in [place], Ext, Brot, Llf  Musculoskeletal:      Cervical back: Pain with movement and muscular tenderness present. Decreased range of motion.      Thoracic back: Spasms and tenderness present. Decreased range of motion.      Lumbar back: Spasms and tenderness present. Decreased range of motion. Negative right straight leg raise test and negative left straight leg raise test.        Back:       Comments: Pnful and limited in Brot, Llf, Ext   Lymphadenopathy:      Cervical: No cervical adenopathy.   Skin:     General: Skin is warm and dry.   Neurological:      Mental Status: She is alert and oriented to person, place, and time.      Gait: Gait is intact.   Psychiatric:         Mood and Affect: Mood and affect normal.         Behavior: Behavior normal.       SOFT TISSUE ASSESSMENT: Hypertonicity and tenderness palpated B C5-T6. L5/S1 erector spinae, L glutr edmin upper traps, lev scap, SCM, scalene, rhomboid, suboccipitals JOINT RECTRICTIONS: C5-T, L5/S1 and L SIJ     Return in about 3 weeks (around 1/2/2025) for Next scheduled follow up.

## 2024-12-17 ENCOUNTER — PROCEDURE VISIT (OUTPATIENT)
Age: 34
End: 2024-12-17
Payer: MEDICARE

## 2024-12-17 VITALS
OXYGEN SATURATION: 97 % | HEIGHT: 65 IN | WEIGHT: 166 LBS | DIASTOLIC BLOOD PRESSURE: 70 MMHG | BODY MASS INDEX: 27.66 KG/M2 | HEART RATE: 86 BPM | SYSTOLIC BLOOD PRESSURE: 112 MMHG

## 2024-12-17 DIAGNOSIS — S33.6XXA SPRAIN OF SACROILIAC REGION, INITIAL ENCOUNTER: ICD-10-CM

## 2024-12-17 DIAGNOSIS — M99.02 SEGMENTAL DYSFUNCTION OF THORACIC REGION: ICD-10-CM

## 2024-12-17 DIAGNOSIS — M99.03 SEGMENTAL DYSFUNCTION OF LUMBAR REGION: ICD-10-CM

## 2024-12-17 DIAGNOSIS — S16.1XXA CERVICAL MYOFASCIAL STRAIN, INITIAL ENCOUNTER: Primary | ICD-10-CM

## 2024-12-17 PROCEDURE — 98941 CHIROPRACT MANJ 3-4 REGIONS: CPT | Performed by: CHIROPRACTOR

## 2025-01-09 ENCOUNTER — PROCEDURE VISIT (OUTPATIENT)
Age: 35
End: 2025-01-09
Payer: MEDICARE

## 2025-01-09 VITALS — HEIGHT: 65 IN | BODY MASS INDEX: 27.66 KG/M2 | WEIGHT: 166 LBS

## 2025-01-09 DIAGNOSIS — M99.04 SEGMENTAL DYSFUNCTION OF SACRAL REGION: ICD-10-CM

## 2025-01-09 DIAGNOSIS — M99.02 SEGMENTAL DYSFUNCTION OF THORACIC REGION: Primary | ICD-10-CM

## 2025-01-09 DIAGNOSIS — M62.838 MUSCLE SPASM: ICD-10-CM

## 2025-01-09 DIAGNOSIS — M70.61 GREATER TROCHANTERIC BURSITIS OF RIGHT HIP: ICD-10-CM

## 2025-01-09 DIAGNOSIS — M99.03 SEGMENTAL DYSFUNCTION OF LUMBAR REGION: ICD-10-CM

## 2025-01-09 DIAGNOSIS — M99.01 SEGMENTAL DYSFUNCTION OF CERVICAL REGION: ICD-10-CM

## 2025-01-09 PROCEDURE — 98941 CHIROPRACT MANJ 3-4 REGIONS: CPT | Performed by: CHIROPRACTOR

## 2025-01-09 NOTE — PROGRESS NOTES
"Initial date of service: 11/9/23    Diagnoses and all orders for this visit:    Segmental dysfunction of thoracic region    Muscle spasm    Segmental dysfunction of lumbar region    Segmental dysfunction of cervical region    Segmental dysfunction of sacral region    Greater trochanteric bursitis of right hip    Pt suffered exacerbation but responded to tx with reduced pain and increased ROM    TREATMENT: [id number]  Ther-ex: IASTM - discussed post procedure soreness and/or ecchymosis for up to 36 hrs, applied to affected mm hypertonicities; wall cullen, axial retraction, upper trap stretch, lev scap stretch, SCM stretch, glute stretch. Thoracic mobilization/manipulation: prone P-A mob, supine A-P manip; cervical mobilization/manipulation: traction, diversified supine graded mobilization, lumbar side laying graded mobs; L SI LAT    HPI:  Alma Delia Hall is a 34 y.o. female   Chief Complaint   Patient presents with    Back Pain     Back- left lower pain scale- 4     Neck Pain     Pain score - 1     Hip Pain     New hip pain. Had during pregnancy and came back around 3 weeks ago. Pain score is a 4/5     Pt presents for tx for neck/back pain. Pt reports increasing episodes of \"throwing back or neck out\". Pt has undergone chiro care with benefit in the past. Pt had baby early 2023 1/9: Pt reports feeling and moving better since last tx, but R hip bothering her when she walks and lays on it    Back Pain  This is a recurrent problem. The current episode started more than 1 month ago. The problem occurs daily. The problem has been waxing and waning since onset. The pain is present in the gluteal, lumbar spine, sacro-iliac and thoracic spine. The quality of the pain is described as aching and stabbing. The pain does not radiate. The symptoms are aggravated by bending, twisting and standing. Stiffness is present In the morning.   Neck Pain   This is a recurrent problem. The current episode started more than 1 month ago. The problem " occurs daily. The problem has been waxing and waning. The pain is present in the left side, midline and right side. The quality of the pain is described as aching and stabbing. The symptoms are aggravated by bending, position, stress and twisting. The pain is Worse during the day.   Hip Pain       History reviewed. No pertinent past medical history.   The following portions of the patient's history were reviewed and updated as appropriate: allergies, past family history, past medical history, past social history, past surgical history, and problem list.  Review of Systems   Musculoskeletal:  Positive for back pain and neck pain.     Physical Exam  Neck:        Comments: Pnful and limited in [place], Ext, Brot, Llf  Musculoskeletal:      Cervical back: Pain with movement and muscular tenderness present. Decreased range of motion.      Thoracic back: Spasms and tenderness present. Decreased range of motion.      Lumbar back: Spasms and tenderness present. Decreased range of motion. Negative right straight leg raise test and negative left straight leg raise test.        Back:       Comments: Pnful and limited in Brot, Llf, Ext   Lymphadenopathy:      Cervical: No cervical adenopathy.   Skin:     General: Skin is warm and dry.   Neurological:      Mental Status: She is alert and oriented to person, place, and time.      Gait: Gait is intact.   Psychiatric:         Mood and Affect: Mood and affect normal.         Behavior: Behavior normal.       SOFT TISSUE ASSESSMENT: Hypertonicity and tenderness palpated B C5-T6. L5/S1 erector spinae, L glutr edmin upper traps, lev scap, SCM, scalene, rhomboid, suboccipitals JOINT RECTRICTIONS: C5-T, L5/S1 and L SIJ     Return in about 1 week (around 1/16/2025) for Next scheduled follow up.

## 2025-01-21 ENCOUNTER — PROCEDURE VISIT (OUTPATIENT)
Age: 35
End: 2025-01-21
Payer: MEDICARE

## 2025-01-21 VITALS — BODY MASS INDEX: 27.66 KG/M2 | WEIGHT: 166 LBS | HEIGHT: 65 IN

## 2025-01-21 DIAGNOSIS — M99.04 SEGMENTAL DYSFUNCTION OF SACRAL REGION: ICD-10-CM

## 2025-01-21 DIAGNOSIS — M62.838 MUSCLE SPASM: ICD-10-CM

## 2025-01-21 DIAGNOSIS — M99.02 SEGMENTAL DYSFUNCTION OF THORACIC REGION: Primary | ICD-10-CM

## 2025-01-21 DIAGNOSIS — M99.01 SEGMENTAL DYSFUNCTION OF CERVICAL REGION: ICD-10-CM

## 2025-01-21 DIAGNOSIS — M99.03 SEGMENTAL DYSFUNCTION OF LUMBAR REGION: ICD-10-CM

## 2025-01-21 PROCEDURE — 98941 CHIROPRACT MANJ 3-4 REGIONS: CPT | Performed by: CHIROPRACTOR

## 2025-01-21 NOTE — PROGRESS NOTES
"Initial date of service: 11/9/23    Diagnoses and all orders for this visit:    Segmental dysfunction of thoracic region    Muscle spasm    Segmental dysfunction of lumbar region    Segmental dysfunction of cervical region    Segmental dysfunction of sacral region    Pt suffered exacerbation but responded to tx with reduced pain and increased ROM    TREATMENT: [id number]  Ther-ex: IASTM - discussed post procedure soreness and/or ecchymosis for up to 36 hrs, applied to affected mm hypertonicities; wall cullen, axial retraction, upper trap stretch, lev scap stretch, SCM stretch, glute stretch. Thoracic mobilization/manipulation: prone P-A mob, supine A-P manip; cervical mobilization/manipulation: traction, diversified supine graded mobilization, lumbar side laying graded mobs; L SI LAT    HPI:  Alma Delia Hall is a 34 y.o. female   Chief Complaint   Patient presents with    Back Pain     Lower back pain-3    Neck Pain     Neck pain-3     Pt presents for tx for neck/back pain. Pt reports increasing episodes of \"throwing back or neck out\". Pt has undergone chiro care with benefit in the past. Pt had baby early 2023 1/21: Pt reports feeling and moving better since last tx; suffered mild flare-up    Back Pain  This is a recurrent problem. The current episode started more than 1 month ago. The problem occurs daily. The problem has been waxing and waning since onset. The pain is present in the gluteal, lumbar spine, sacro-iliac and thoracic spine. The quality of the pain is described as aching and stabbing. The pain does not radiate. The symptoms are aggravated by bending, twisting and standing. Stiffness is present In the morning.   Neck Pain   This is a recurrent problem. The current episode started more than 1 month ago. The problem occurs daily. The problem has been waxing and waning. The pain is present in the left side, midline and right side. The quality of the pain is described as aching and stabbing. The symptoms are " aggravated by bending, position, stress and twisting. The pain is Worse during the day.   Hip Pain       History reviewed. No pertinent past medical history.   The following portions of the patient's history were reviewed and updated as appropriate: allergies, past family history, past medical history, past social history, past surgical history, and problem list.  Review of Systems   Musculoskeletal:  Positive for back pain and neck pain.     Physical Exam  Neck:        Comments: Pnful and limited in [place], Ext, Brot, Llf  Musculoskeletal:      Cervical back: Pain with movement and muscular tenderness present. Decreased range of motion.      Thoracic back: Spasms and tenderness present. Decreased range of motion.      Lumbar back: Spasms and tenderness present. Decreased range of motion. Negative right straight leg raise test and negative left straight leg raise test.        Back:       Comments: Pnful and limited in Brot, Llf, Ext   Lymphadenopathy:      Cervical: No cervical adenopathy.   Skin:     General: Skin is warm and dry.   Neurological:      Mental Status: She is alert and oriented to person, place, and time.      Gait: Gait is intact.   Psychiatric:         Mood and Affect: Mood and affect normal.         Behavior: Behavior normal.       SOFT TISSUE ASSESSMENT: Hypertonicity and tenderness palpated B C5-T6. L5/S1 erector spinae, L glutr edmin upper traps, lev scap, SCM, scalene, rhomboid, suboccipitals JOINT RECTRICTIONS: C5-T, L5/S1 and L SIJ     Return in about 3 weeks (around 2/11/2025) for Next scheduled follow up.

## 2025-04-01 ENCOUNTER — TELEPHONE (OUTPATIENT)
Age: 35
End: 2025-04-01

## 2025-04-01 NOTE — TELEPHONE ENCOUNTER
LMVM with cb #.  Offering to sched chiro follow up.  Appt avail as early as 4/3.  CB if would like to sched.

## 2025-05-20 ENCOUNTER — PROCEDURE VISIT (OUTPATIENT)
Age: 35
End: 2025-05-20
Payer: MEDICARE

## 2025-05-20 VITALS — BODY MASS INDEX: 27.66 KG/M2 | HEIGHT: 65 IN | WEIGHT: 166 LBS

## 2025-05-20 DIAGNOSIS — M62.838 MUSCLE SPASM: ICD-10-CM

## 2025-05-20 DIAGNOSIS — M99.04 SEGMENTAL DYSFUNCTION OF SACRAL REGION: ICD-10-CM

## 2025-05-20 DIAGNOSIS — M99.02 SEGMENTAL DYSFUNCTION OF THORACIC REGION: Primary | ICD-10-CM

## 2025-05-20 DIAGNOSIS — M99.01 SEGMENTAL DYSFUNCTION OF CERVICAL REGION: ICD-10-CM

## 2025-05-20 DIAGNOSIS — M99.03 SEGMENTAL DYSFUNCTION OF LUMBAR REGION: ICD-10-CM

## 2025-05-20 PROCEDURE — 98941 CHIROPRACT MANJ 3-4 REGIONS: CPT | Performed by: CHIROPRACTOR

## 2025-05-20 NOTE — PROGRESS NOTES
"Initial date of service: 11/9/23    Diagnoses and all orders for this visit:    Segmental dysfunction of thoracic region    Muscle spasm    Segmental dysfunction of lumbar region    Segmental dysfunction of cervical region    Segmental dysfunction of sacral region    Chronic intermittent neck/back pain secondary to repetitive strain injury. Pt suffered exacerbation but responded to tx with reduced pain and increased ROM. F/up 1-2 wks    TREATMENT: [id number]  Ther-ex: IASTM - discussed post procedure soreness and/or ecchymosis for up to 36 hrs, applied to affected mm hypertonicities; wall cullen, axial retraction, upper trap stretch, lev scap stretch, SCM stretch, glute stretch. Thoracic mobilization/manipulation: prone P-A mob, supine A-P manip; cervical mobilization/manipulation: traction, diversified supine graded mobilization, lumbar side laying graded mobs; L SI LAT    HPI:  Alma Delia Hall is a 35 y.o. female   Chief Complaint   Patient presents with    Back Pain     Lower back pain-5    Neck Pain     Neck pain-5     Pt presents for tx for neck/back pain. Pt reports increasing episodes of \"throwing back or neck out\". Pt has undergone chiro care with benefit in the past. Pt had baby early 2023 5/20: Pt reports suffering flare-up on trip back from [place]    Back Pain  This is a recurrent problem. The current episode started more than 1 month ago. The problem occurs daily. The problem has been waxing and waning since onset. The pain is present in the gluteal, lumbar spine, sacro-iliac and thoracic spine. The quality of the pain is described as aching and stabbing. The pain does not radiate. The symptoms are aggravated by bending, twisting and standing. Stiffness is present In the morning.   Neck Pain   This is a recurrent problem. The current episode started more than 1 month ago. The problem occurs daily. The problem has been waxing and waning. The pain is present in the left side, midline and right side. The quality of the " pain is described as aching and stabbing. The symptoms are aggravated by bending, position, stress and twisting. The pain is Worse during the day.   Hip Pain       History reviewed. No pertinent past medical history.   The following portions of the patient's history were reviewed and updated as appropriate: allergies, past family history, past medical history, past social history, past surgical history, and problem list.  Review of Systems   Musculoskeletal:  Positive for back pain and neck pain.     Physical Exam  Neck:        Comments: Pnful and limited in [place], Ext, [place], [place]  Musculoskeletal:      Cervical back: Pain with movement and muscular tenderness present. Decreased range of motion.      Thoracic back: Spasms and tenderness present. Decreased range of motion.      Lumbar back: Spasms and tenderness present. Decreased range of motion. Negative right straight leg raise test and negative left straight leg raise test.        Back:       Comments: Pnful and limited in [place], [place], Ext   Lymphadenopathy:      Cervical: No cervical adenopathy.     Skin:     General: Skin is warm and dry.     Neurological:      Mental Status: She is alert and oriented to person, place, and time.      Gait: Gait is intact.     Psychiatric:         Mood and Affect: Mood and affect normal.         Behavior: Behavior normal.       SOFT TISSUE ASSESSMENT: Hypertonicity and tenderness palpated B C5-T6. L5/S1 erector spinae, L glutr edmin upper traps, lev scap, SCM, scalene, rhomboid, suboccipitals JOINT RECTRICTIONS: C5-T, L5/S1 and L SIJ     Return in about 2 weeks (around 6/3/2025) for Next scheduled follow up.

## 2025-06-10 ENCOUNTER — PROCEDURE VISIT (OUTPATIENT)
Age: 35
End: 2025-06-10
Payer: MEDICARE

## 2025-06-10 VITALS — WEIGHT: 166 LBS | HEIGHT: 65 IN | BODY MASS INDEX: 27.66 KG/M2

## 2025-06-10 DIAGNOSIS — M99.04 SEGMENTAL DYSFUNCTION OF SACRAL REGION: ICD-10-CM

## 2025-06-10 DIAGNOSIS — M99.03 SEGMENTAL DYSFUNCTION OF LUMBAR REGION: ICD-10-CM

## 2025-06-10 DIAGNOSIS — M99.01 SEGMENTAL DYSFUNCTION OF CERVICAL REGION: ICD-10-CM

## 2025-06-10 DIAGNOSIS — M99.02 SEGMENTAL DYSFUNCTION OF THORACIC REGION: Primary | ICD-10-CM

## 2025-06-10 DIAGNOSIS — M62.838 MUSCLE SPASM: ICD-10-CM

## 2025-06-10 PROCEDURE — 98941 CHIROPRACT MANJ 3-4 REGIONS: CPT | Performed by: CHIROPRACTOR

## 2025-06-10 NOTE — PROGRESS NOTES
"Initial date of service: 11/9/23    Diagnoses and all orders for this visit:    Segmental dysfunction of thoracic region    Muscle spasm    Segmental dysfunction of lumbar region    Segmental dysfunction of cervical region    Segmental dysfunction of sacral region    Chronic intermittent neck/back pain secondary to repetitive strain injury. Pt suffered exacerbation but responded to tx with reduced pain and increased ROM. F/up 2-3 wks    TREATMENT: [id number]  Ther-ex: IASTM - discussed post procedure soreness and/or ecchymosis for up to 36 hrs, applied to affected mm hypertonicities; wall cullen, axial retraction, upper trap stretch, lev scap stretch, SCM stretch, glute stretch. Thoracic mobilization/manipulation: prone P-A mob, supine A-P manip; cervical mobilization/manipulation: traction, diversified supine graded mobilization, lumbar side laying graded mobs; L SI LAT    HPI:  Alma Delia Hall is a 35 y.o. female   Chief Complaint   Patient presents with    Back Pain     Lower back pain-4    Neck Pain     Neck pain-4     Pt presents for tx for neck/back pain. Pt reports increasing episodes of \"throwing back or neck out\". Pt has undergone chiro care with benefit in the past. Pt had baby early 2023  6/10: Pt reports feeling and moving better since last tx; suffered mild flare-up    Back Pain  This is a recurrent problem. The current episode started more than 1 month ago. The problem occurs daily. The problem has been waxing and waning since onset. The pain is present in the gluteal, lumbar spine, sacro-iliac and thoracic spine. The quality of the pain is described as aching and stabbing. The pain does not radiate. The symptoms are aggravated by bending, twisting and standing. Stiffness is present In the morning.   Neck Pain   This is a recurrent problem. The current episode started more than 1 month ago. The problem occurs daily. The problem has been waxing and waning. The pain is present in the left side, midline and right " side. The quality of the pain is described as aching and stabbing. The symptoms are aggravated by bending, position, stress and twisting. The pain is Worse during the day.   Hip Pain       No past medical history on file.   The following portions of the patient's history were reviewed and updated as appropriate: allergies, past family history, past medical history, past social history, past surgical history, and problem list.  Review of Systems   Musculoskeletal:  Positive for back pain and neck pain.     Physical Exam  Neck:        Comments: Pnful and limited in [place], [place], [place], [place]  Musculoskeletal:      Cervical back: Pain with movement and muscular tenderness present. Decreased range of motion.      Thoracic back: Spasms and tenderness present. Decreased range of motion.      Lumbar back: Spasms and tenderness present. Decreased range of motion. Negative right straight leg raise test and negative left straight leg raise test.        Back:       Comments: Pnful and limited in [place], Llf, Ext   Lymphadenopathy:      Cervical: No cervical adenopathy.     Skin:     General: Skin is warm and dry.     Neurological:      Mental Status: She is alert and oriented to person, place, and time.      Gait: Gait is intact.     Psychiatric:         Mood and Affect: Mood and affect normal.         Behavior: Behavior normal.       SOFT TISSUE ASSESSMENT: Hypertonicity and tenderness palpated B C5-T6. L5/S1 erector spinae, L glutr edmin upper traps, lev scap, SCM, scalene, rhomboid, suboccipitals JOINT RECTRICTIONS: C5-T, L5/S1 and L SIJ     Return in about 3 weeks (around 7/1/2025) for Next scheduled follow up.

## 2025-07-01 ENCOUNTER — PROCEDURE VISIT (OUTPATIENT)
Age: 35
End: 2025-07-01
Payer: MEDICARE

## 2025-07-01 VITALS — BODY MASS INDEX: 27.66 KG/M2 | HEIGHT: 65 IN | WEIGHT: 166 LBS

## 2025-07-01 DIAGNOSIS — M62.838 MUSCLE SPASM: ICD-10-CM

## 2025-07-01 DIAGNOSIS — M99.04 SEGMENTAL DYSFUNCTION OF SACRAL REGION: ICD-10-CM

## 2025-07-01 DIAGNOSIS — M99.03 SEGMENTAL DYSFUNCTION OF LUMBAR REGION: ICD-10-CM

## 2025-07-01 DIAGNOSIS — M99.02 SEGMENTAL DYSFUNCTION OF THORACIC REGION: Primary | ICD-10-CM

## 2025-07-01 DIAGNOSIS — M99.01 SEGMENTAL DYSFUNCTION OF CERVICAL REGION: ICD-10-CM

## 2025-07-01 PROCEDURE — 98941 CHIROPRACT MANJ 3-4 REGIONS: CPT | Performed by: CHIROPRACTOR

## 2025-07-01 NOTE — PROGRESS NOTES
"Initial date of service: 11/9/23    Diagnoses and all orders for this visit:    Segmental dysfunction of thoracic region    Muscle spasm    Segmental dysfunction of lumbar region    Segmental dysfunction of cervical region    Segmental dysfunction of sacral region    Chronic intermittent neck/back pain secondary to repetitive strain injury. Pt suffered exacerbation but responded to tx with reduced pain and increased ROM. F/up 3-4 wks    TREATMENT: [id number]  Ther-ex: IASTM - discussed post procedure soreness and/or ecchymosis for up to 36 hrs, applied to affected mm hypertonicities; wall cullen, axial retraction, upper trap stretch, lev scap stretch, SCM stretch, glute stretch. Thoracic mobilization/manipulation: prone P-A mob, supine A-P manip; cervical mobilization/manipulation: traction, diversified supine graded mobilization, lumbar side laying graded mobs; L SI LAT    HPI:  Alma Delia Hall is a 35 y.o. female   Chief Complaint   Patient presents with    Back Pain     Lower back pain-2    Neck Pain     Neck pain-2     Pt presents for tx for neck/back pain. Pt reports increasing episodes of \"throwing back or neck out\". Pt has undergone chiro care with benefit in the past. Pt had baby early 2023 7/1: Pt reports feeling and moving better since last tx; suffered mild flare-up    Back Pain  This is a recurrent problem. The current episode started more than 1 month ago. The problem occurs daily. The problem has been waxing and waning since onset. The pain is present in the gluteal, lumbar spine, sacro-iliac and thoracic spine. The quality of the pain is described as aching. The pain does not radiate. The symptoms are aggravated by bending, twisting and standing. Stiffness is present In the morning.   Neck Pain   This is a recurrent problem. The current episode started more than 1 month ago. The problem occurs daily. The problem has been waxing and waning. The pain is present in the left side, midline and right side. The quality " of the pain is described as aching and stabbing. The symptoms are aggravated by bending, position, stress and twisting. The pain is Worse during the day.   Hip Pain       No past medical history on file.   The following portions of the patient's history were reviewed and updated as appropriate: allergies, past family history, past medical history, past social history, past surgical history, and problem list.  Review of Systems   Musculoskeletal:  Positive for back pain and neck pain.     Physical Exam  Neck:        Comments: Pnful and limited in [place], [place], [place], [place]  Musculoskeletal:      Cervical back: Pain with movement and muscular tenderness present. Decreased range of motion.      Thoracic back: Spasms and tenderness present. Decreased range of motion.      Lumbar back: Spasms and tenderness present. Decreased range of motion. Negative right straight leg raise test and negative left straight leg raise test.        Back:       Comments: Pnful and limited in [place], [place], Ext   Lymphadenopathy:      Cervical: No cervical adenopathy.     Skin:     General: Skin is warm and dry.     Neurological:      Mental Status: She is alert and oriented to person, place, and time.      Gait: Gait is intact.     Psychiatric:         Mood and Affect: Mood and affect normal.         Behavior: Behavior normal.       SOFT TISSUE ASSESSMENT: Hypertonicity and tenderness palpated B C5-T6. L5/S1 erector spinae, L glutr edmin upper traps, lev scap, SCM, scalene, rhomboid, suboccipitals JOINT RECTRICTIONS: C5-T, L5/S1 and L SIJ     Return in about 3 weeks (around 7/22/2025) for Next scheduled follow up.

## 2025-07-15 ENCOUNTER — PROCEDURE VISIT (OUTPATIENT)
Age: 35
End: 2025-07-15
Payer: MEDICARE

## 2025-07-15 VITALS — WEIGHT: 166 LBS | BODY MASS INDEX: 27.66 KG/M2 | HEIGHT: 65 IN

## 2025-07-15 DIAGNOSIS — M99.02 SEGMENTAL DYSFUNCTION OF THORACIC REGION: Primary | ICD-10-CM

## 2025-07-15 DIAGNOSIS — M99.03 SEGMENTAL DYSFUNCTION OF LUMBAR REGION: ICD-10-CM

## 2025-07-15 DIAGNOSIS — M99.01 SEGMENTAL DYSFUNCTION OF CERVICAL REGION: ICD-10-CM

## 2025-07-15 DIAGNOSIS — M62.838 MUSCLE SPASM: ICD-10-CM

## 2025-07-15 DIAGNOSIS — M99.04 SEGMENTAL DYSFUNCTION OF SACRAL REGION: ICD-10-CM

## 2025-07-15 PROCEDURE — 98941 CHIROPRACT MANJ 3-4 REGIONS: CPT | Performed by: CHIROPRACTOR
